# Patient Record
Sex: MALE | Race: WHITE | NOT HISPANIC OR LATINO
[De-identification: names, ages, dates, MRNs, and addresses within clinical notes are randomized per-mention and may not be internally consistent; named-entity substitution may affect disease eponyms.]

---

## 2017-04-14 ENCOUNTER — TRANSCRIPTION ENCOUNTER (OUTPATIENT)
Age: 52
End: 2017-04-14

## 2017-04-17 PROBLEM — Z00.00 ENCOUNTER FOR PREVENTIVE HEALTH EXAMINATION: Status: ACTIVE | Noted: 2017-04-17

## 2017-04-20 ENCOUNTER — APPOINTMENT (OUTPATIENT)
Dept: UROLOGY | Facility: CLINIC | Age: 52
End: 2017-04-20

## 2017-04-20 VITALS
SYSTOLIC BLOOD PRESSURE: 157 MMHG | DIASTOLIC BLOOD PRESSURE: 95 MMHG | HEIGHT: 70 IN | HEART RATE: 65 BPM | BODY MASS INDEX: 32.78 KG/M2 | WEIGHT: 229 LBS

## 2017-04-20 DIAGNOSIS — Z95.1 PRESENCE OF AORTOCORONARY BYPASS GRAFT: ICD-10-CM

## 2017-04-22 LAB
ALBUMIN SERPL ELPH-MCNC: 4.2 G/DL
ALP BLD-CCNC: 70 U/L
ALT SERPL-CCNC: 19 U/L
ANION GAP SERPL CALC-SCNC: 15 MMOL/L
APPEARANCE: CLEAR
AST SERPL-CCNC: 16 U/L
BACTERIA UR CULT: NORMAL
BACTERIA: NEGATIVE
BILIRUB SERPL-MCNC: 0.5 MG/DL
BILIRUBIN URINE: NEGATIVE
BLOOD URINE: NEGATIVE
BUN SERPL-MCNC: 17 MG/DL
CALCIUM SERPL-MCNC: 8.8 MG/DL
CHLORIDE SERPL-SCNC: 102 MMOL/L
CO2 SERPL-SCNC: 26 MMOL/L
COLOR: YELLOW
CREAT SERPL-MCNC: 0.96 MG/DL
GLUCOSE QUALITATIVE U: NORMAL MG/DL
GLUCOSE SERPL-MCNC: 92 MG/DL
KETONES URINE: NEGATIVE
LEUKOCYTE ESTERASE URINE: NEGATIVE
MICROSCOPIC-UA: NORMAL
NITRITE URINE: NEGATIVE
PH URINE: 5.5
POTASSIUM SERPL-SCNC: 4 MMOL/L
PROT SERPL-MCNC: 7.1 G/DL
PROTEIN URINE: NEGATIVE MG/DL
PSA FREE FLD-MCNC: 19.9 %
PSA FREE SERPL-MCNC: 0.43 NG/ML
PSA SERPL-MCNC: 2.16 NG/ML
RED BLOOD CELLS URINE: 4 /HPF
SODIUM SERPL-SCNC: 143 MMOL/L
SPECIFIC GRAVITY URINE: 1.02
SQUAMOUS EPITHELIAL CELLS: 0 /HPF
UROBILINOGEN URINE: NORMAL MG/DL
WHITE BLOOD CELLS URINE: 1 /HPF

## 2017-05-19 ENCOUNTER — APPOINTMENT (OUTPATIENT)
Dept: ULTRASOUND IMAGING | Facility: CLINIC | Age: 52
End: 2017-05-19

## 2017-05-19 ENCOUNTER — OUTPATIENT (OUTPATIENT)
Dept: OUTPATIENT SERVICES | Facility: HOSPITAL | Age: 52
LOS: 1 days | End: 2017-05-19

## 2017-05-19 ENCOUNTER — APPOINTMENT (OUTPATIENT)
Dept: UROLOGY | Facility: CLINIC | Age: 52
End: 2017-05-19

## 2017-05-19 ENCOUNTER — TRANSCRIPTION ENCOUNTER (OUTPATIENT)
Age: 52
End: 2017-05-19

## 2017-05-19 RX ORDER — CIPROFLOXACIN HYDROCHLORIDE 500 MG/1
500 TABLET, FILM COATED ORAL
Refills: 0 | Status: DISCONTINUED | COMMUNITY
End: 2017-05-19

## 2017-05-20 LAB
APPEARANCE: CLEAR
BILIRUBIN URINE: NEGATIVE
BLOOD URINE: NEGATIVE
COLOR: YELLOW
GLUCOSE QUALITATIVE U: NORMAL MG/DL
KETONES URINE: ABNORMAL
LEUKOCYTE ESTERASE URINE: NEGATIVE
NITRITE URINE: NEGATIVE
PH URINE: 5.5
PROTEIN URINE: NEGATIVE MG/DL
SPECIFIC GRAVITY URINE: 1.02
UROBILINOGEN URINE: NORMAL MG/DL

## 2017-06-21 ENCOUNTER — APPOINTMENT (OUTPATIENT)
Dept: RADIOLOGY | Facility: CLINIC | Age: 52
End: 2017-06-21

## 2017-06-21 ENCOUNTER — OUTPATIENT (OUTPATIENT)
Dept: OUTPATIENT SERVICES | Facility: HOSPITAL | Age: 52
LOS: 1 days | End: 2017-06-21

## 2017-11-06 ENCOUNTER — APPOINTMENT (OUTPATIENT)
Dept: UROLOGY | Facility: CLINIC | Age: 52
End: 2017-11-06
Payer: COMMERCIAL

## 2017-11-06 PROCEDURE — 99214 OFFICE O/P EST MOD 30 MIN: CPT

## 2017-11-07 LAB
APPEARANCE: CLEAR
BACTERIA: NEGATIVE
BILIRUBIN URINE: NEGATIVE
BLOOD URINE: NEGATIVE
CALCIUM OXALATE CRYSTALS: ABNORMAL
COLOR: ABNORMAL
GLUCOSE QUALITATIVE U: NEGATIVE MG/DL
HYALINE CASTS: 0 /LPF
KETONES URINE: ABNORMAL
LEUKOCYTE ESTERASE URINE: NEGATIVE
MICROSCOPIC-UA: NORMAL
NITRITE URINE: NEGATIVE
PH URINE: 5
PROTEIN URINE: NEGATIVE MG/DL
RED BLOOD CELLS URINE: 0 /HPF
SPECIFIC GRAVITY URINE: 1.03
SQUAMOUS EPITHELIAL CELLS: 1 /HPF
UROBILINOGEN URINE: NEGATIVE MG/DL
WHITE BLOOD CELLS URINE: 1 /HPF

## 2017-12-18 ENCOUNTER — RX RENEWAL (OUTPATIENT)
Age: 52
End: 2017-12-18

## 2017-12-21 ENCOUNTER — RX RENEWAL (OUTPATIENT)
Age: 52
End: 2017-12-21

## 2018-01-04 ENCOUNTER — APPOINTMENT (OUTPATIENT)
Dept: CARDIOLOGY | Facility: CLINIC | Age: 53
End: 2018-01-04

## 2018-01-11 ENCOUNTER — APPOINTMENT (OUTPATIENT)
Dept: CARDIOLOGY | Facility: CLINIC | Age: 53
End: 2018-01-11
Payer: COMMERCIAL

## 2018-01-11 VITALS
SYSTOLIC BLOOD PRESSURE: 129 MMHG | BODY MASS INDEX: 33.36 KG/M2 | HEIGHT: 70 IN | HEART RATE: 70 BPM | RESPIRATION RATE: 15 BRPM | WEIGHT: 233 LBS | DIASTOLIC BLOOD PRESSURE: 80 MMHG

## 2018-01-11 PROCEDURE — 93000 ELECTROCARDIOGRAM COMPLETE: CPT

## 2018-01-11 PROCEDURE — 99213 OFFICE O/P EST LOW 20 MIN: CPT

## 2018-01-11 RX ORDER — METOPROLOL TARTRATE 50 MG/1
50 TABLET, FILM COATED ORAL
Refills: 0 | Status: COMPLETED | COMMUNITY
End: 2018-01-11

## 2018-02-20 ENCOUNTER — RX RENEWAL (OUTPATIENT)
Age: 53
End: 2018-02-20

## 2018-04-06 RX ORDER — METOPROLOL SUCCINATE 25 MG/1
25 TABLET, EXTENDED RELEASE ORAL
Qty: 90 | Refills: 1 | Status: DISCONTINUED | COMMUNITY
Start: 2018-01-11 | End: 2018-04-06

## 2018-05-03 ENCOUNTER — APPOINTMENT (OUTPATIENT)
Dept: CARDIOLOGY | Facility: CLINIC | Age: 53
End: 2018-05-03
Payer: COMMERCIAL

## 2018-05-03 VITALS
HEART RATE: 72 BPM | DIASTOLIC BLOOD PRESSURE: 85 MMHG | BODY MASS INDEX: 33.07 KG/M2 | WEIGHT: 231 LBS | SYSTOLIC BLOOD PRESSURE: 122 MMHG | HEIGHT: 70 IN | RESPIRATION RATE: 15 BRPM

## 2018-05-03 PROCEDURE — 99214 OFFICE O/P EST MOD 30 MIN: CPT

## 2018-05-07 ENCOUNTER — APPOINTMENT (OUTPATIENT)
Dept: UROLOGY | Facility: CLINIC | Age: 53
End: 2018-05-07
Payer: COMMERCIAL

## 2018-05-07 DIAGNOSIS — L72.3 SEBACEOUS CYST: ICD-10-CM

## 2018-05-07 DIAGNOSIS — L08.9 SEBACEOUS CYST: ICD-10-CM

## 2018-05-07 PROCEDURE — 99213 OFFICE O/P EST LOW 20 MIN: CPT

## 2018-05-16 ENCOUNTER — APPOINTMENT (OUTPATIENT)
Dept: ULTRASOUND IMAGING | Facility: CLINIC | Age: 53
End: 2018-05-16

## 2018-05-23 ENCOUNTER — APPOINTMENT (OUTPATIENT)
Dept: ULTRASOUND IMAGING | Facility: CLINIC | Age: 53
End: 2018-05-23

## 2018-05-24 ENCOUNTER — RX RENEWAL (OUTPATIENT)
Age: 53
End: 2018-05-24

## 2018-08-28 ENCOUNTER — RX RENEWAL (OUTPATIENT)
Age: 53
End: 2018-08-28

## 2018-10-11 ENCOUNTER — APPOINTMENT (OUTPATIENT)
Dept: CARDIOLOGY | Facility: CLINIC | Age: 53
End: 2018-10-11

## 2018-10-22 ENCOUNTER — RX RENEWAL (OUTPATIENT)
Age: 53
End: 2018-10-22

## 2018-10-25 ENCOUNTER — APPOINTMENT (OUTPATIENT)
Dept: CARDIOLOGY | Facility: CLINIC | Age: 53
End: 2018-10-25

## 2018-11-09 ENCOUNTER — OUTPATIENT (OUTPATIENT)
Dept: OUTPATIENT SERVICES | Facility: HOSPITAL | Age: 53
LOS: 1 days | End: 2018-11-09

## 2018-11-09 ENCOUNTER — APPOINTMENT (OUTPATIENT)
Dept: ULTRASOUND IMAGING | Facility: CLINIC | Age: 53
End: 2018-11-09

## 2018-11-09 ENCOUNTER — APPOINTMENT (OUTPATIENT)
Dept: UROLOGY | Facility: CLINIC | Age: 53
End: 2018-11-09
Payer: COMMERCIAL

## 2018-11-09 PROCEDURE — 99213 OFFICE O/P EST LOW 20 MIN: CPT

## 2018-11-15 ENCOUNTER — APPOINTMENT (OUTPATIENT)
Dept: CARDIOLOGY | Facility: CLINIC | Age: 53
End: 2018-11-15

## 2018-11-19 ENCOUNTER — RX RENEWAL (OUTPATIENT)
Age: 53
End: 2018-11-19

## 2018-12-13 ENCOUNTER — APPOINTMENT (OUTPATIENT)
Dept: CARDIOLOGY | Facility: CLINIC | Age: 53
End: 2018-12-13
Payer: COMMERCIAL

## 2018-12-13 VITALS
HEART RATE: 69 BPM | BODY MASS INDEX: 32.78 KG/M2 | HEIGHT: 70 IN | WEIGHT: 229 LBS | SYSTOLIC BLOOD PRESSURE: 122 MMHG | DIASTOLIC BLOOD PRESSURE: 74 MMHG

## 2018-12-13 PROCEDURE — 93000 ELECTROCARDIOGRAM COMPLETE: CPT

## 2018-12-13 PROCEDURE — 99213 OFFICE O/P EST LOW 20 MIN: CPT

## 2018-12-13 NOTE — PHYSICAL EXAM
[General Appearance - Well Developed] : well developed [Normal Appearance] : normal appearance [Well Groomed] : well groomed [General Appearance - Well Nourished] : well nourished [No Deformities] : no deformities [General Appearance - In No Acute Distress] : no acute distress [Normal Conjunctiva] : the conjunctiva exhibited no abnormalities [Normal Oral Mucosa] : normal oral mucosa [Normal Jugular Venous A Waves Present] : normal jugular venous A waves present [Normal Jugular Venous V Waves Present] : normal jugular venous V waves present [No Jugular Venous Chacko A Waves] : no jugular venous chacko A waves [Respiration, Rhythm And Depth] : normal respiratory rhythm and effort [Exaggerated Use Of Accessory Muscles For Inspiration] : no accessory muscle use [Auscultation Breath Sounds / Voice Sounds] : lungs were clear to auscultation bilaterally [Abdomen Soft] : soft [Abnormal Walk] : normal gait [Nail Clubbing] : no clubbing of the fingernails [Cyanosis, Localized] : no localized cyanosis [Petechial Hemorrhages (___cm)] : no petechial hemorrhages [Skin Color & Pigmentation] : normal skin color and pigmentation [] : no rash [No Venous Stasis] : no venous stasis [Skin Lesions] : no skin lesions [No Skin Ulcers] : no skin ulcer [No Xanthoma] : no  xanthoma was observed [Oriented To Time, Place, And Person] : oriented to person, place, and time [Affect] : the affect was normal [Mood] : the mood was normal [No Anxiety] : not feeling anxious [5th Left ICS - MCL] : palpated at the 5th LICS in the midclavicular line [Normal] : normal [No Precordial Heave] : no precordial heave was noted [Normal Rate] : normal [Rhythm Regular] : regular [Normal S1] : normal S1 [Normal S2] : normal S2 [Click] : no click [Pericardial Rub] : no pericardial rub [I] : a grade 1 [2+] : left 2+ [No Pitting Edema] : no pitting edema present

## 2018-12-13 NOTE — REASON FOR VISIT
[Follow-Up - Clinic] : a clinic follow-up of [Angina Pectoris] : angina pectoris [Hyperlipidemia] : hyperlipidemia [Hypertension] : hypertension [Prior Myocardial Infarction] : a prior myocardial infarction [FreeTextEntry1] : Murmur

## 2018-12-13 NOTE — DISCUSSION/SUMMARY
[FreeTextEntry1] : This is a 53-year-old male with past medical history significant for coronary artery disease status post myocardial infarction, status post coronary artery bypass surgery in February 2016 at [facility], hyperlipidemia, hypertension, comes in for followup cardiac evaluation. He denies chest pain, shortness breath, dizziness or syncope. The patient remains on Micardis 80 mg daily, Toprol-XL 25 mg, aspirin 325 mg, and Nexium. He is also a dose of Crestor 10 mg per day. \par \par Blood work done in January 2018 demonstrated a direct LDL of 69 mg/dL. The patient will continue on his current medication. He reports that the Micardis is too expensive and it'll price out Benicar and Avapro.\par The patient has an upper respiratory tract infection with productive cough. He was started on a Z-Abdulaziz and is instructed to followup with his primary care physician. He will followup with me in 3 months.\par \par  echo Doppler examination done March 29, 2017 demonstrated ejection fraction of 50%, mild mitral and tricuspid valve regurgitation, aortic valve sclerosis with mild to moderate aortic valve regurgitation, and hypokinesis involving inferior wall, septum, consistent with mild left ventricular dysfunction. \par l.\par The patient understands that aerobic exercises must be increased to 40 minutes 4 times per week. A detailed discussion of lifestyle modification was done today. The patient has a good understanding of the diagnosis, and treatment plan. Lifestyle modification was also outlined.

## 2018-12-13 NOTE — DISCUSSION/SUMMARY
[FreeTextEntry1] : This is a 53-year-old male with past medical history significant for coronary artery disease status post myocardial infarction, status post coronary artery bypass surgery in February 2016 at [facility], hyperlipidemia, hypertension, comes in for followup cardiac evaluation. He denies chest pain, shortness breath, dizziness or syncope. The patient remains on Micardis 80 mg daily, Toprol-XL 25 mg, aspirin 325 mg, and Nexium. He is also a dose of Crestor 10 mg per day. \par \par Blood work done in January 2018 demonstrated a direct LDL of 69 mg/dL. The patient will continue on his current medication. He reports that the Micardis is too expensive and it'll price out Benicar and Avapro.\par The patient has an upper respiratory tract infection with productive cough. He was started on a Z-Abdulaziz and is instructed to followup with his primary care physician. He will followup with me in 3 months.\par \par  echo Doppler examination done March 29, 2017 demonstrated ejection fraction of 50%, mild mitral and tricuspid valve regurgitation, aortic valve sclerosis with mild to moderate aortic valve regurgitation, and hypokinesis involving inferior wall, septum, consistent with mild left ventricular dysfunction. \par l.\par The patient understands that aerobic exercises must be increased to 40 minutes 4 times per week. A detailed discussion of lifestyle modification was done today. The patient has a good understanding of the diagnosis, and treatment plan. Lifestyle modification was also outlined.

## 2018-12-18 RX ORDER — IBUPROFEN 800 MG/1
800 TABLET, FILM COATED ORAL
Refills: 0 | Status: COMPLETED | COMMUNITY
End: 2018-12-18

## 2018-12-18 RX ORDER — TAMSULOSIN HYDROCHLORIDE 0.4 MG/1
CAPSULE ORAL
Refills: 0 | Status: COMPLETED | COMMUNITY
End: 2018-12-18

## 2018-12-18 RX ORDER — AZITHROMYCIN 250 MG/1
250 TABLET, FILM COATED ORAL
Qty: 6 | Refills: 1 | Status: COMPLETED | COMMUNITY
Start: 2018-05-03 | End: 2018-12-18

## 2018-12-18 RX ORDER — OXYCODONE HYDROCHLORIDE AND ACETAMINOPHEN 5; 325 MG/1; MG/1
5-325 TABLET ORAL
Refills: 0 | Status: COMPLETED | COMMUNITY
End: 2018-12-18

## 2018-12-18 RX ORDER — AZITHROMYCIN 250 MG/1
250 TABLET, FILM COATED ORAL
Qty: 6 | Refills: 0 | Status: COMPLETED | COMMUNITY
Start: 2018-05-03 | End: 2018-12-18

## 2018-12-18 RX ORDER — TAMSULOSIN HYDROCHLORIDE 0.4 MG/1
0.4 CAPSULE ORAL
Qty: 180 | Refills: 1 | Status: DISCONTINUED | COMMUNITY
Start: 2017-04-20 | End: 2018-12-18

## 2018-12-18 RX ORDER — LANSOPRAZOLE 30 MG/1
30 CAPSULE, DELAYED RELEASE ORAL
Refills: 0 | Status: COMPLETED | COMMUNITY
End: 2018-12-18

## 2018-12-18 RX ORDER — METOLAZONE 5 MG/1
TABLET ORAL
Refills: 0 | Status: COMPLETED | COMMUNITY
End: 2018-12-18

## 2018-12-18 RX ORDER — CEPHALEXIN 500 MG/1
500 CAPSULE ORAL
Qty: 28 | Refills: 0 | Status: COMPLETED | COMMUNITY
Start: 2018-05-07 | End: 2018-12-18

## 2018-12-18 RX ORDER — TAMSULOSIN HYDROCHLORIDE 0.4 MG/1
0.4 CAPSULE ORAL
Qty: 180 | Refills: 1 | Status: DISCONTINUED | COMMUNITY
Start: 2017-05-19 | End: 2018-12-18

## 2019-01-14 ENCOUNTER — RX RENEWAL (OUTPATIENT)
Age: 54
End: 2019-01-14

## 2019-01-15 ENCOUNTER — RX RENEWAL (OUTPATIENT)
Age: 54
End: 2019-01-15

## 2019-02-12 ENCOUNTER — RX RENEWAL (OUTPATIENT)
Age: 54
End: 2019-02-12

## 2019-02-20 ENCOUNTER — RX RENEWAL (OUTPATIENT)
Age: 54
End: 2019-02-20

## 2019-04-10 ENCOUNTER — RX RENEWAL (OUTPATIENT)
Age: 54
End: 2019-04-10

## 2019-04-18 ENCOUNTER — APPOINTMENT (OUTPATIENT)
Dept: CARDIOLOGY | Facility: CLINIC | Age: 54
End: 2019-04-18

## 2019-05-10 ENCOUNTER — APPOINTMENT (OUTPATIENT)
Dept: UROLOGY | Facility: CLINIC | Age: 54
End: 2019-05-10
Payer: COMMERCIAL

## 2019-05-10 DIAGNOSIS — N20.0 CALCULUS OF KIDNEY: ICD-10-CM

## 2019-05-10 DIAGNOSIS — N41.0 ACUTE PROSTATITIS: ICD-10-CM

## 2019-05-10 DIAGNOSIS — K59.00 CONSTIPATION, UNSPECIFIED: ICD-10-CM

## 2019-05-10 LAB
BILIRUB UR QL STRIP: ABNORMAL
CLARITY UR: NORMAL
COLLECTION METHOD: NORMAL
GLUCOSE UR-MCNC: NORMAL
HCG UR QL: 1 EU/DL
HGB UR QL STRIP.AUTO: ABNORMAL
KETONES UR-MCNC: 15
LEUKOCYTE ESTERASE UR QL STRIP: ABNORMAL
NITRITE UR QL STRIP: NORMAL
PH UR STRIP: 5.5
PROT UR STRIP-MCNC: 100
SP GR UR STRIP: 1.02

## 2019-05-10 PROCEDURE — 51798 US URINE CAPACITY MEASURE: CPT

## 2019-05-10 PROCEDURE — 99214 OFFICE O/P EST MOD 30 MIN: CPT | Mod: 25

## 2019-05-10 PROCEDURE — 81003 URINALYSIS AUTO W/O SCOPE: CPT | Mod: QW

## 2019-05-10 NOTE — HISTORY OF PRESENT ILLNESS
[Urinary Urgency] : urinary urgency [Urinary Frequency] : urinary frequency [Straining] : straining [Weak Stream] : weak stream [Dysuria] : dysuria [Intermittency] : intermittency [FreeTextEntry1] : 51 year old male with history of stone (left side 9 mm) one year ago. Had elevated PSA which he did not followup.\par Had been on Flomax 2 tabs for urinary symptoms and stone disease.\par No followup for 1 year.\par \par \par 5.18.2017\par no further flank pain\par did not pass any further stone\par no pain; no longer straining urine. \par urinary symptoms improved on Flomax 2 tabs daily\par improved on 2 tablets; patient does not believe his symptoms warrant intervention.\par \par 11.16.2017\par has not passed stone / passage of small stone\par urinary symptoms continue to be much improved\par no further episodes of pain or hematuria\par \par 5.7.2018\par no further flank pain\par urinating with good stream\par nocturia x 1\par stopped drinking MATE caffeinated product \par \par 11.9.2018\par patient did not get renal usg re ? ureteral stone passage\par Patient thinks he passed ? small stone\par was supposed to get USG but did not because of excision of back cyst\par taking Tamsulosin\par when stopped procedure symtoms get worse and unhappy\par \par 5.10.2019\par 6 days ago developed \par developed urinary symptoms\par dysuria and frequency\par called relative treated nitrufurantoin\par mild improvement\par also complaining of constipation\par after laxative symtoms \par continues tamsulosin 2 tabs daily [Urinary Incontinence] : no urinary incontinence [Urinary Retention] : no urinary retention [Nocturia] : no nocturia [Bladder Spasm] : no bladder spasm [Hematuria - Microscopic] : no microscopic hematuria [Hematuria - Gross] : no gross hematuria

## 2019-05-10 NOTE — ASSESSMENT
[FreeTextEntry1] : Patient presents with symptoms of acute prostatitis which were treated with nitofuranotin with some improvement.  He reports having had chills which have now resolved.  Symptoms began when took allegra.  He has stopped.  He also complains of constipation whiich he has been treating effectively with laxatives.\par He examination demonstrates NO PVR and TENDER Prostate\par He will be switched to the appropriate antibiotics\par He never had CT which was previously ordered re stone disease.\par This was reordered.\par We discussed fluid management\par We discussed bowel management\par We discussed stone disease\par He was warned to present to ER if he develops fevers chills etc.\par

## 2019-05-13 LAB — BACTERIA UR CULT: NORMAL

## 2019-05-29 ENCOUNTER — OTHER (OUTPATIENT)
Age: 54
End: 2019-05-29

## 2019-05-30 ENCOUNTER — FORM ENCOUNTER (OUTPATIENT)
Age: 54
End: 2019-05-30

## 2019-05-30 DIAGNOSIS — R39.9 UNSPECIFIED SYMPTOMS AND SIGNS INVOLVING THE GENITOURINARY SYSTEM: ICD-10-CM

## 2019-05-31 ENCOUNTER — APPOINTMENT (OUTPATIENT)
Dept: UROLOGY | Facility: CLINIC | Age: 54
End: 2019-05-31
Payer: COMMERCIAL

## 2019-05-31 ENCOUNTER — OUTPATIENT (OUTPATIENT)
Dept: OUTPATIENT SERVICES | Facility: HOSPITAL | Age: 54
LOS: 1 days | End: 2019-05-31

## 2019-05-31 ENCOUNTER — APPOINTMENT (OUTPATIENT)
Dept: CT IMAGING | Facility: CLINIC | Age: 54
End: 2019-05-31
Payer: COMMERCIAL

## 2019-05-31 DIAGNOSIS — R31.29 OTHER MICROSCOPIC HEMATURIA: ICD-10-CM

## 2019-05-31 DIAGNOSIS — N20.1 CALCULUS OF URETER: ICD-10-CM

## 2019-05-31 PROCEDURE — 74178 CT ABD&PLV WO CNTR FLWD CNTR: CPT | Mod: 26

## 2019-05-31 PROCEDURE — 99213 OFFICE O/P EST LOW 20 MIN: CPT

## 2019-05-31 RX ORDER — SULFAMETHOXAZOLE AND TRIMETHOPRIM 800; 160 MG/1; MG/1
800-160 TABLET ORAL
Qty: 28 | Refills: 0 | Status: DISCONTINUED | COMMUNITY
Start: 2019-05-10 | End: 2019-05-31

## 2019-05-31 NOTE — HISTORY OF PRESENT ILLNESS
[FreeTextEntry1] : 51 year old male with history of stone (left side 9 mm) one year ago. Had elevated PSA which he did not followup.\par Had been on Flomax 2 tabs for urinary symptoms and stone disease.\par No followup for 1 year.\par \par \par 5.18.2017\par no further flank pain\par did not pass any further stone\par no pain; no longer straining urine. \par urinary symptoms improved on Flomax 2 tabs daily\par improved on 2 tablets; patient does not believe his symptoms warrant intervention.\par \par 11.16.2017\par has not passed stone / passage of small stone\par urinary symptoms continue to be much improved\par no further episodes of pain or hematuria\par \par 5.7.2018\par no further flank pain\par urinating with good stream\par nocturia x 1\par stopped drinking MATE caffeinated product \par \par 11.9.2018\par patient did not get renal usg re ? ureteral stone passage\par Patient thinks he passed ? small stone\par was supposed to get USG but did not because of excision of back cyst\par taking Tamsulosin\par when stopped procedure symtoms get worse and unhappy\par \par 5.10.2019\par 6 days ago developed \par developed urinary symptoms\par dysuria and frequency\par called relative treated nitrufurantoin\par mild improvement\par also complaining of constipation\par after laxative symtoms \par continues tamsulosin 2 tabs daily \par \par 5.31.2019\par returns after treatment of prostatitis\par \par

## 2019-05-31 NOTE — ASSESSMENT
[FreeTextEntry1] : Patient now asymptomatic after acute prostatits\par Has not had CT - approval obtained w/o contrast\par Needs followup re ureteral stone\par Complaining of low libido - will assess T and prolactin

## 2019-06-04 PROBLEM — R39.9 ABNORMAL RENAL FINDING: Status: ACTIVE | Noted: 2019-06-04

## 2019-07-15 ENCOUNTER — RX RENEWAL (OUTPATIENT)
Age: 54
End: 2019-07-15

## 2019-08-09 ENCOUNTER — RX RENEWAL (OUTPATIENT)
Age: 54
End: 2019-08-09

## 2019-08-12 ENCOUNTER — RX RENEWAL (OUTPATIENT)
Age: 54
End: 2019-08-12

## 2019-08-14 ENCOUNTER — RX RENEWAL (OUTPATIENT)
Age: 54
End: 2019-08-14

## 2019-08-22 ENCOUNTER — APPOINTMENT (OUTPATIENT)
Dept: CARDIOLOGY | Facility: CLINIC | Age: 54
End: 2019-08-22
Payer: COMMERCIAL

## 2019-08-22 VITALS — WEIGHT: 216 LBS | BODY MASS INDEX: 30.24 KG/M2 | HEIGHT: 71 IN

## 2019-08-22 PROCEDURE — 99214 OFFICE O/P EST MOD 30 MIN: CPT

## 2019-08-22 PROCEDURE — 93000 ELECTROCARDIOGRAM COMPLETE: CPT

## 2019-08-22 RX ORDER — ROSUVASTATIN CALCIUM 10 MG/1
10 TABLET, FILM COATED ORAL
Qty: 30 | Refills: 0 | Status: COMPLETED | COMMUNITY
Start: 2018-01-11 | End: 2019-08-22

## 2019-08-29 RX ORDER — DOCUSATE SODIUM 100 MG/1
100 CAPSULE ORAL TWICE DAILY
Qty: 60 | Refills: 0 | Status: COMPLETED | COMMUNITY
Start: 2019-05-10 | End: 2019-08-29

## 2019-09-05 ENCOUNTER — RX RENEWAL (OUTPATIENT)
Age: 54
End: 2019-09-05

## 2019-11-25 ENCOUNTER — RX RENEWAL (OUTPATIENT)
Age: 54
End: 2019-11-25

## 2019-12-20 ENCOUNTER — APPOINTMENT (OUTPATIENT)
Dept: UROLOGY | Facility: CLINIC | Age: 54
End: 2019-12-20

## 2020-02-24 ENCOUNTER — RX RENEWAL (OUTPATIENT)
Age: 55
End: 2020-02-24

## 2020-03-12 ENCOUNTER — APPOINTMENT (OUTPATIENT)
Dept: CARDIOLOGY | Facility: CLINIC | Age: 55
End: 2020-03-12
Payer: COMMERCIAL

## 2020-03-12 VITALS
DIASTOLIC BLOOD PRESSURE: 82 MMHG | HEART RATE: 70 BPM | WEIGHT: 231 LBS | HEIGHT: 71 IN | BODY MASS INDEX: 32.34 KG/M2 | SYSTOLIC BLOOD PRESSURE: 130 MMHG | RESPIRATION RATE: 15 BRPM

## 2020-03-12 PROCEDURE — 93000 ELECTROCARDIOGRAM COMPLETE: CPT

## 2020-03-12 PROCEDURE — 99214 OFFICE O/P EST MOD 30 MIN: CPT

## 2020-03-23 ENCOUNTER — RX RENEWAL (OUTPATIENT)
Age: 55
End: 2020-03-23

## 2020-09-04 ENCOUNTER — TRANSCRIPTION ENCOUNTER (OUTPATIENT)
Age: 55
End: 2020-09-04

## 2020-09-04 ENCOUNTER — APPOINTMENT (OUTPATIENT)
Dept: UROLOGY | Facility: CLINIC | Age: 55
End: 2020-09-04
Payer: COMMERCIAL

## 2020-09-04 VITALS — DIASTOLIC BLOOD PRESSURE: 88 MMHG | SYSTOLIC BLOOD PRESSURE: 133 MMHG | TEMPERATURE: 97.3 F

## 2020-09-04 DIAGNOSIS — Z85.828 PERSONAL HISTORY OF OTHER MALIGNANT NEOPLASM OF SKIN: ICD-10-CM

## 2020-09-04 DIAGNOSIS — N28.89 OTHER SPECIFIED DISORDERS OF KIDNEY AND URETER: ICD-10-CM

## 2020-09-04 PROCEDURE — 99214 OFFICE O/P EST MOD 30 MIN: CPT

## 2020-09-04 NOTE — ASSESSMENT
[FreeTextEntry1] : Patient now asymptomatic after acute prostatits\par Has not had CT - approval obtained w/o contrast\par Needs followup re ureteral stone\par Complaining of low libido - will assess T and prolactin\par \par 9.4.2020 \par patient did not followup on CT\par again emphasized need for CT to followup renal mass\par continue Flomax 2 tabs\par patient interested in urololift when completes surgery for Basal cell\par \par complaining of loss of libido\par will recheck T\par discussed PSA \par \par referral to Isac Norman re symptomatic umbilical hernia\par

## 2020-09-04 NOTE — HISTORY OF PRESENT ILLNESS
[Currently Experiencing ___] :  [unfilled] [Urinary Frequency] : urinary frequency [Nocturia] : nocturia [FreeTextEntry1] : 51 year old male with history of stone (left side 9 mm) one year ago. Had elevated PSA which he did not followup.\par Had been on Flomax 2 tabs for urinary symptoms and stone disease.\par No followup for 1 year.\par \par \par 5.18.2017\par no further flank pain\par did not pass any further stone\par no pain; no longer straining urine. \par urinary symptoms improved on Flomax 2 tabs daily\par improved on 2 tablets; patient does not believe his symptoms warrant intervention.\par \par 11.16.2017\par has not passed stone / passage of small stone\par urinary symptoms continue to be much improved\par no further episodes of pain or hematuria\par \par 5.7.2018\par no further flank pain\par urinating with good stream\par nocturia x 1\par stopped drinking MATE caffeinated product \par \par 11.9.2018\par patient did not get renal usg re ? ureteral stone passage\par Patient thinks he passed ? small stone\par was supposed to get USG but did not because of excision of back cyst\par taking Tamsulosin\par when stopped procedure symtoms get worse and unhappy\par \par 5.10.2019\par 6 days ago developed \par developed urinary symptoms\par dysuria and frequency\par called relative treated nitrufurantoin\par mild improvement\par also complaining of constipation\par after laxative symtoms \par continues tamsulosin 2 tabs daily \par \par 5.31.2019\par returns after treatment of prostatitis\par \par \par 9.4.2020\par continues with LUTS\par on tamsulosin 2 tabs daily\par continues to drink coffee and cola\par he had been drinking 20 cups per day and now 2; aware of impact on symptoms\par complaining of decreased libido\par marital discord; life stresses

## 2020-09-05 ENCOUNTER — TRANSCRIPTION ENCOUNTER (OUTPATIENT)
Age: 55
End: 2020-09-05

## 2020-09-05 LAB — PSA SERPL-MCNC: 2.44 NG/ML

## 2020-09-08 ENCOUNTER — TRANSCRIPTION ENCOUNTER (OUTPATIENT)
Age: 55
End: 2020-09-08

## 2020-09-08 LAB
ANION GAP SERPL CALC-SCNC: 19 MMOL/L
BUN SERPL-MCNC: 23 MG/DL
CALCIUM SERPL-MCNC: 9.2 MG/DL
CHLORIDE SERPL-SCNC: 104 MMOL/L
CO2 SERPL-SCNC: 22 MMOL/L
CREAT SERPL-MCNC: 1.35 MG/DL
GLUCOSE SERPL-MCNC: 108 MG/DL
POTASSIUM SERPL-SCNC: 4.1 MMOL/L
PROLACTIN SERPL-MCNC: 10 NG/ML
SODIUM SERPL-SCNC: 145 MMOL/L

## 2020-09-09 ENCOUNTER — TRANSCRIPTION ENCOUNTER (OUTPATIENT)
Age: 55
End: 2020-09-09

## 2020-09-09 LAB
TESTOST BND SERPL-MCNC: 6.5 PG/ML
TESTOST SERPL-MCNC: 232.5 NG/DL

## 2020-09-10 ENCOUNTER — OUTPATIENT (OUTPATIENT)
Dept: OUTPATIENT SERVICES | Facility: HOSPITAL | Age: 55
LOS: 1 days | End: 2020-09-10

## 2020-09-10 ENCOUNTER — RESULT REVIEW (OUTPATIENT)
Age: 55
End: 2020-09-10

## 2020-09-10 ENCOUNTER — APPOINTMENT (OUTPATIENT)
Dept: CT IMAGING | Facility: CLINIC | Age: 55
End: 2020-09-10
Payer: COMMERCIAL

## 2020-09-10 ENCOUNTER — TRANSCRIPTION ENCOUNTER (OUTPATIENT)
Age: 55
End: 2020-09-10

## 2020-09-10 PROCEDURE — 74178 CT ABD&PLV WO CNTR FLWD CNTR: CPT | Mod: 26

## 2020-09-14 ENCOUNTER — TRANSCRIPTION ENCOUNTER (OUTPATIENT)
Age: 55
End: 2020-09-14

## 2020-09-14 ENCOUNTER — APPOINTMENT (OUTPATIENT)
Dept: UROLOGY | Facility: AMBULATORY SURGERY CENTER | Age: 55
End: 2020-09-14

## 2020-09-23 ENCOUNTER — APPOINTMENT (OUTPATIENT)
Dept: UROLOGY | Facility: CLINIC | Age: 55
End: 2020-09-23

## 2020-10-07 ENCOUNTER — OUTPATIENT (OUTPATIENT)
Dept: OUTPATIENT SERVICES | Facility: HOSPITAL | Age: 55
LOS: 1 days | End: 2020-10-07
Payer: COMMERCIAL

## 2020-10-07 ENCOUNTER — APPOINTMENT (OUTPATIENT)
Dept: UROLOGY | Facility: CLINIC | Age: 55
End: 2020-10-07
Payer: COMMERCIAL

## 2020-10-07 VITALS — OXYGEN SATURATION: 98 % | DIASTOLIC BLOOD PRESSURE: 82 MMHG | SYSTOLIC BLOOD PRESSURE: 121 MMHG | TEMPERATURE: 97.8 F

## 2020-10-07 DIAGNOSIS — K42.9 UMBILICAL HERNIA W/OUT OBSTRUCTION OR GANGRENE: ICD-10-CM

## 2020-10-07 DIAGNOSIS — N28.89 OTHER SPECIFIED DISORDERS OF KIDNEY AND URETER: ICD-10-CM

## 2020-10-07 PROCEDURE — 71046 X-RAY EXAM CHEST 2 VIEWS: CPT | Mod: 26

## 2020-10-07 PROCEDURE — 71046 X-RAY EXAM CHEST 2 VIEWS: CPT

## 2020-10-07 PROCEDURE — 99214 OFFICE O/P EST MOD 30 MIN: CPT

## 2020-10-08 LAB
ALBUMIN SERPL ELPH-MCNC: 4.5 G/DL
ALP BLD-CCNC: 77 U/L
ALT SERPL-CCNC: 18 U/L
ANION GAP SERPL CALC-SCNC: 15 MMOL/L
APPEARANCE: CLEAR
APTT BLD: 29.8 SEC
AST SERPL-CCNC: 18 U/L
BACTERIA: NEGATIVE
BASOPHILS # BLD AUTO: 0.03 K/UL
BASOPHILS NFR BLD AUTO: 0.5 %
BILIRUB SERPL-MCNC: 0.4 MG/DL
BILIRUBIN URINE: NEGATIVE
BLOOD URINE: NEGATIVE
BUN SERPL-MCNC: 11 MG/DL
CALCIUM SERPL-MCNC: 9 MG/DL
CHLORIDE SERPL-SCNC: 104 MMOL/L
CO2 SERPL-SCNC: 21 MMOL/L
COLOR: NORMAL
CREAT SERPL-MCNC: 1.02 MG/DL
EOSINOPHIL # BLD AUTO: 0.11 K/UL
EOSINOPHIL NFR BLD AUTO: 2 %
GLUCOSE QUALITATIVE U: NEGATIVE
GLUCOSE SERPL-MCNC: 99 MG/DL
HCT VFR BLD CALC: 40.6 %
HGB BLD-MCNC: 13.1 G/DL
HYALINE CASTS: 0 /LPF
IMM GRANULOCYTES NFR BLD AUTO: 0.2 %
INR PPP: 1.13 RATIO
KETONES URINE: NEGATIVE
LEUKOCYTE ESTERASE URINE: NEGATIVE
LYMPHOCYTES # BLD AUTO: 1.88 K/UL
LYMPHOCYTES NFR BLD AUTO: 33.6 %
MAN DIFF?: NORMAL
MCHC RBC-ENTMCNC: 30.3 PG
MCHC RBC-ENTMCNC: 32.3 GM/DL
MCV RBC AUTO: 94 FL
MICROSCOPIC-UA: NORMAL
MONOCYTES # BLD AUTO: 0.47 K/UL
MONOCYTES NFR BLD AUTO: 8.4 %
NEUTROPHILS # BLD AUTO: 3.09 K/UL
NEUTROPHILS NFR BLD AUTO: 55.3 %
NITRITE URINE: NEGATIVE
PH URINE: 6
PLATELET # BLD AUTO: 189 K/UL
POTASSIUM SERPL-SCNC: 4.1 MMOL/L
PROT SERPL-MCNC: 6.7 G/DL
PROTEIN URINE: NEGATIVE
PT BLD: 13.3 SEC
RBC # BLD: 4.32 M/UL
RBC # FLD: 13.1 %
RED BLOOD CELLS URINE: 1 /HPF
SODIUM SERPL-SCNC: 141 MMOL/L
SPECIFIC GRAVITY URINE: 1.01
SQUAMOUS EPITHELIAL CELLS: 0 /HPF
UROBILINOGEN URINE: NORMAL
WBC # FLD AUTO: 5.59 K/UL
WHITE BLOOD CELLS URINE: 1 /HPF

## 2020-10-08 NOTE — ASSESSMENT
[FreeTextEntry1] : 54yo male with 2.3cm right lower pole renal mass\par Reviewed imaging studies and report with patient\par Discussed diagnosis of small renal mass and differential diagnosis.\par 20-30% of small renal masses are benign.\par Renal masses less than 3cm are felt to have very low metastatic potential \par Discussed treatment options including observation, percutaneous biopsy, percutaneous needle ablation by cryotherapy or radiofrequency ablation, or partial nephrectomy.\par The 6mm growth in 1 year is concerning as average growth rate is about 3mm/year\par He is interested in robotic partial nephrectomy. Reviewed procedure, risks and benefits\par Medical and cardiac clearance\par CXR for staging

## 2020-10-08 NOTE — PHYSICAL EXAM
[General Appearance - Well Developed] : well developed [General Appearance - Well Nourished] : well nourished [Normal Appearance] : normal appearance [Well Groomed] : well groomed [General Appearance - In No Acute Distress] : no acute distress [FreeTextEntry1] : obese, BMI = 32 [Edema] : no peripheral edema [Respiration, Rhythm And Depth] : normal respiratory rhythm and effort [Exaggerated Use Of Accessory Muscles For Inspiration] : no accessory muscle use [Abdomen Soft] : soft [Abdomen Tenderness] : non-tender [Costovertebral Angle Tenderness] : no ~M costovertebral angle tenderness [Normal Station and Gait] : the gait and station were normal for the patient's age [] : no rash [No Focal Deficits] : no focal deficits [Oriented To Time, Place, And Person] : oriented to person, place, and time [Affect] : the affect was normal [Mood] : the mood was normal [Not Anxious] : not anxious

## 2020-10-08 NOTE — HISTORY OF PRESENT ILLNESS
[FreeTextEntry1] : 56yo male with history of CAD s/p CABG a few years ago, referred by Dr. Mason for 2.3cm right renal mass. Mass was previously 1.7cm 1 year ago. Asymptomatic. No history of smoking.  [None] : None

## 2020-10-11 LAB — BACTERIA UR CULT: NORMAL

## 2020-10-12 LAB — URINE CYTOLOGY: NORMAL

## 2020-10-14 ENCOUNTER — NON-APPOINTMENT (OUTPATIENT)
Age: 55
End: 2020-10-14

## 2020-10-14 ENCOUNTER — APPOINTMENT (OUTPATIENT)
Dept: CARDIOLOGY | Facility: CLINIC | Age: 55
End: 2020-10-14
Payer: COMMERCIAL

## 2020-10-14 VITALS
RESPIRATION RATE: 16 BRPM | HEART RATE: 70 BPM | WEIGHT: 233 LBS | DIASTOLIC BLOOD PRESSURE: 82 MMHG | HEIGHT: 71 IN | BODY MASS INDEX: 32.62 KG/M2 | SYSTOLIC BLOOD PRESSURE: 132 MMHG

## 2020-10-14 DIAGNOSIS — Z01.810 ENCOUNTER FOR PREPROCEDURAL CARDIOVASCULAR EXAMINATION: ICD-10-CM

## 2020-10-14 PROCEDURE — 99214 OFFICE O/P EST MOD 30 MIN: CPT

## 2020-10-14 PROCEDURE — 93000 ELECTROCARDIOGRAM COMPLETE: CPT | Mod: NC

## 2020-11-06 ENCOUNTER — APPOINTMENT (OUTPATIENT)
Dept: CARDIOLOGY | Facility: CLINIC | Age: 55
End: 2020-11-06
Payer: COMMERCIAL

## 2020-11-06 VITALS
DIASTOLIC BLOOD PRESSURE: 80 MMHG | SYSTOLIC BLOOD PRESSURE: 130 MMHG | HEIGHT: 71 IN | HEART RATE: 68 BPM | BODY MASS INDEX: 32.9 KG/M2 | RESPIRATION RATE: 15 BRPM | WEIGHT: 235 LBS

## 2020-11-06 DIAGNOSIS — Z01.810 ENCOUNTER FOR PREPROCEDURAL CARDIOVASCULAR EXAMINATION: ICD-10-CM

## 2020-11-06 PROCEDURE — 99072 ADDL SUPL MATRL&STAF TM PHE: CPT

## 2020-11-06 PROCEDURE — 93306 TTE W/DOPPLER COMPLETE: CPT

## 2020-11-06 PROCEDURE — 93015 CV STRESS TEST SUPVJ I&R: CPT

## 2020-11-06 PROCEDURE — ZZZZZ: CPT

## 2020-11-06 PROCEDURE — 99214 OFFICE O/P EST MOD 30 MIN: CPT | Mod: 25

## 2020-11-11 ENCOUNTER — TRANSCRIPTION ENCOUNTER (OUTPATIENT)
Age: 55
End: 2020-11-11

## 2020-11-11 LAB — SARS-COV-2 N GENE NPH QL NAA+PROBE: NOT DETECTED

## 2020-11-11 NOTE — PATIENT PROFILE ADULT - STATED REASON FOR ADMISSION
Pt unable to assist with health history, to every question, pt responded, \"I don't know\", or  \"I never learned to read, you will have to ask my son when he gets here.\"  Son that did come, stated that his brother knew more about the medications that the pt takes, but he \"isn't that reliable.\"  Pharmacy Tech trying to contact pt's son for PTA meds.    lap partial nephrectomy

## 2020-11-11 NOTE — PRE-OP CHECKLIST - 1.
12:30pm patient take aspirin 325mg daily Dr. aLla's pffice notified to give instruction onaspirin whether to take it or not.

## 2020-11-12 ENCOUNTER — APPOINTMENT (OUTPATIENT)
Dept: UROLOGY | Facility: HOSPITAL | Age: 55
End: 2020-11-12

## 2020-11-12 ENCOUNTER — INPATIENT (INPATIENT)
Facility: HOSPITAL | Age: 55
LOS: 2 days | Discharge: HOME | DRG: 657 | End: 2020-11-15
Attending: UROLOGY | Admitting: UROLOGY
Payer: COMMERCIAL

## 2020-11-12 ENCOUNTER — RESULT REVIEW (OUTPATIENT)
Age: 55
End: 2020-11-12

## 2020-11-12 VITALS
WEIGHT: 233.91 LBS | TEMPERATURE: 97 F | HEART RATE: 70 BPM | SYSTOLIC BLOOD PRESSURE: 110 MMHG | OXYGEN SATURATION: 97 % | HEIGHT: 70 IN | RESPIRATION RATE: 16 BRPM | DIASTOLIC BLOOD PRESSURE: 73 MMHG

## 2020-11-12 DIAGNOSIS — Z41.9 ENCOUNTER FOR PROCEDURE FOR PURPOSES OTHER THAN REMEDYING HEALTH STATE, UNSPECIFIED: Chronic | ICD-10-CM

## 2020-11-12 DIAGNOSIS — Z98.890 OTHER SPECIFIED POSTPROCEDURAL STATES: Chronic | ICD-10-CM

## 2020-11-12 LAB
ANION GAP SERPL CALC-SCNC: 10 MMOL/L — SIGNIFICANT CHANGE UP (ref 5–17)
BASE EXCESS BLDA CALC-SCNC: -2.7 MMOL/L — LOW (ref -2–3)
BLD GP AB SCN SERPL QL: NEGATIVE — SIGNIFICANT CHANGE UP
BLD GP AB SCN SERPL QL: NEGATIVE — SIGNIFICANT CHANGE UP
BUN SERPL-MCNC: 16 MG/DL — SIGNIFICANT CHANGE UP (ref 7–23)
CA-I BLDA-SCNC: 1.06 MMOL/L — LOW (ref 1.12–1.3)
CALCIUM SERPL-MCNC: 8.3 MG/DL — LOW (ref 8.4–10.5)
CHLORIDE SERPL-SCNC: 104 MMOL/L — SIGNIFICANT CHANGE UP (ref 96–108)
CO2 SERPL-SCNC: 25 MMOL/L — SIGNIFICANT CHANGE UP (ref 22–31)
COHGB MFR BLDA: 0.3 % — SIGNIFICANT CHANGE UP
CREAT SERPL-MCNC: 1.23 MG/DL — SIGNIFICANT CHANGE UP (ref 0.5–1.3)
GLUCOSE BLDC GLUCOMTR-MCNC: 134 MG/DL — HIGH (ref 70–99)
GLUCOSE SERPL-MCNC: 155 MG/DL — HIGH (ref 70–99)
HCO3 BLDA-SCNC: 23 MMOL/L — SIGNIFICANT CHANGE UP (ref 21–28)
HCT VFR BLD CALC: 37.4 % — LOW (ref 39–50)
HGB BLD-MCNC: 12.7 G/DL — LOW (ref 13–17)
HGB BLDA-MCNC: 12.5 G/DL — LOW (ref 13–17)
MAGNESIUM SERPL-MCNC: 1.9 MG/DL — SIGNIFICANT CHANGE UP (ref 1.6–2.6)
MCHC RBC-ENTMCNC: 29.8 PG — SIGNIFICANT CHANGE UP (ref 27–34)
MCHC RBC-ENTMCNC: 34 GM/DL — SIGNIFICANT CHANGE UP (ref 32–36)
MCV RBC AUTO: 87.8 FL — SIGNIFICANT CHANGE UP (ref 80–100)
METHGB MFR BLDA: 0.5 % — SIGNIFICANT CHANGE UP
NRBC # BLD: 0 /100 WBCS — SIGNIFICANT CHANGE UP (ref 0–0)
O2 CT VFR BLDA CALC: 17.4 ML/DL — SIGNIFICANT CHANGE UP (ref 15–23)
OXYHGB MFR BLDA: 98 % — SIGNIFICANT CHANGE UP (ref 94–100)
PCO2 BLDA: 42 MMHG — SIGNIFICANT CHANGE UP (ref 35–48)
PH BLDA: 7.35 — SIGNIFICANT CHANGE UP (ref 7.35–7.45)
PHOSPHATE SERPL-MCNC: 3.3 MG/DL — SIGNIFICANT CHANGE UP (ref 2.5–4.5)
PLATELET # BLD AUTO: 170 K/UL — SIGNIFICANT CHANGE UP (ref 150–400)
PO2 BLDA: 137 MMHG — HIGH (ref 83–108)
POTASSIUM BLDA-SCNC: 4.6 MMOL/L — SIGNIFICANT CHANGE UP (ref 3.5–4.9)
POTASSIUM SERPL-MCNC: 4.8 MMOL/L — SIGNIFICANT CHANGE UP (ref 3.5–5.3)
POTASSIUM SERPL-SCNC: 4.8 MMOL/L — SIGNIFICANT CHANGE UP (ref 3.5–5.3)
RBC # BLD: 4.26 M/UL — SIGNIFICANT CHANGE UP (ref 4.2–5.8)
RBC # FLD: 12.4 % — SIGNIFICANT CHANGE UP (ref 10.3–14.5)
RH IG SCN BLD-IMP: POSITIVE — SIGNIFICANT CHANGE UP
RH IG SCN BLD-IMP: POSITIVE — SIGNIFICANT CHANGE UP
SAO2 % BLDA: 98 % — SIGNIFICANT CHANGE UP (ref 95–100)
SODIUM BLDA-SCNC: 132 MMOL/L — LOW (ref 138–146)
SODIUM SERPL-SCNC: 139 MMOL/L — SIGNIFICANT CHANGE UP (ref 135–145)
WBC # BLD: 10.03 K/UL — SIGNIFICANT CHANGE UP (ref 3.8–10.5)
WBC # FLD AUTO: 10.03 K/UL — SIGNIFICANT CHANGE UP (ref 3.8–10.5)

## 2020-11-12 PROCEDURE — 71045 X-RAY EXAM CHEST 1 VIEW: CPT | Mod: 26

## 2020-11-12 PROCEDURE — 76998 US GUIDE INTRAOP: CPT | Mod: 26

## 2020-11-12 PROCEDURE — 88307 TISSUE EXAM BY PATHOLOGIST: CPT | Mod: 26

## 2020-11-12 PROCEDURE — 50543 LAPARO PARTIAL NEPHRECTOMY: CPT | Mod: RT

## 2020-11-12 PROCEDURE — 88305 TISSUE EXAM BY PATHOLOGIST: CPT | Mod: 26

## 2020-11-12 PROCEDURE — 49565: CPT

## 2020-11-12 PROCEDURE — 49568: CPT

## 2020-11-12 RX ORDER — HYDROMORPHONE HYDROCHLORIDE 2 MG/ML
0.5 INJECTION INTRAMUSCULAR; INTRAVENOUS; SUBCUTANEOUS ONCE
Refills: 0 | Status: DISCONTINUED | OUTPATIENT
Start: 2020-11-12 | End: 2020-11-12

## 2020-11-12 RX ORDER — ACETAMINOPHEN 500 MG
1000 TABLET ORAL ONCE
Refills: 0 | Status: COMPLETED | OUTPATIENT
Start: 2020-11-12 | End: 2020-11-12

## 2020-11-12 RX ORDER — BUPIVACAINE 13.3 MG/ML
20 INJECTION, SUSPENSION, LIPOSOMAL INFILTRATION ONCE
Refills: 0 | Status: DISCONTINUED | OUTPATIENT
Start: 2020-11-12 | End: 2020-11-12

## 2020-11-12 RX ORDER — SODIUM CHLORIDE 9 MG/ML
1000 INJECTION INTRAMUSCULAR; INTRAVENOUS; SUBCUTANEOUS
Refills: 0 | Status: DISCONTINUED | OUTPATIENT
Start: 2020-11-12 | End: 2020-11-14

## 2020-11-12 RX ORDER — METOPROLOL TARTRATE 50 MG
50 TABLET ORAL DAILY
Refills: 0 | Status: DISCONTINUED | OUTPATIENT
Start: 2020-11-12 | End: 2020-11-12

## 2020-11-12 RX ORDER — TAMSULOSIN HYDROCHLORIDE 0.4 MG/1
0.4 CAPSULE ORAL AT BEDTIME
Refills: 0 | Status: DISCONTINUED | OUTPATIENT
Start: 2020-11-12 | End: 2020-11-15

## 2020-11-12 RX ORDER — TELMISARTAN 20 MG/1
1 TABLET ORAL
Qty: 0 | Refills: 0 | DISCHARGE

## 2020-11-12 RX ORDER — ACETAMINOPHEN 500 MG
650 TABLET ORAL EVERY 6 HOURS
Refills: 0 | Status: DISCONTINUED | OUTPATIENT
Start: 2020-11-12 | End: 2020-11-15

## 2020-11-12 RX ORDER — TAMSULOSIN HYDROCHLORIDE 0.4 MG/1
2 CAPSULE ORAL
Qty: 0 | Refills: 0 | DISCHARGE

## 2020-11-12 RX ORDER — ESOMEPRAZOLE MAGNESIUM 40 MG/1
1 CAPSULE, DELAYED RELEASE ORAL
Qty: 0 | Refills: 0 | DISCHARGE

## 2020-11-12 RX ORDER — METOPROLOL TARTRATE 50 MG
50 TABLET ORAL DAILY
Refills: 0 | Status: DISCONTINUED | OUTPATIENT
Start: 2020-11-13 | End: 2020-11-12

## 2020-11-12 RX ORDER — OXYCODONE AND ACETAMINOPHEN 5; 325 MG/1; MG/1
1 TABLET ORAL EVERY 4 HOURS
Refills: 0 | Status: DISCONTINUED | OUTPATIENT
Start: 2020-11-12 | End: 2020-11-15

## 2020-11-12 RX ORDER — METOPROLOL TARTRATE 50 MG
2 TABLET ORAL
Qty: 0 | Refills: 0 | DISCHARGE

## 2020-11-12 RX ORDER — PANTOPRAZOLE SODIUM 20 MG/1
40 TABLET, DELAYED RELEASE ORAL
Refills: 0 | Status: DISCONTINUED | OUTPATIENT
Start: 2020-11-12 | End: 2020-11-15

## 2020-11-12 RX ORDER — GABAPENTIN 400 MG/1
300 CAPSULE ORAL ONCE
Refills: 0 | Status: COMPLETED | OUTPATIENT
Start: 2020-11-12 | End: 2020-11-12

## 2020-11-12 RX ORDER — ROSUVASTATIN CALCIUM 5 MG/1
1 TABLET ORAL
Qty: 0 | Refills: 0 | DISCHARGE

## 2020-11-12 RX ORDER — ASPIRIN/CALCIUM CARB/MAGNESIUM 324 MG
1 TABLET ORAL
Qty: 0 | Refills: 0 | DISCHARGE

## 2020-11-12 RX ORDER — ENOXAPARIN SODIUM 100 MG/ML
40 INJECTION SUBCUTANEOUS ONCE
Refills: 0 | Status: COMPLETED | OUTPATIENT
Start: 2020-11-12 | End: 2020-11-12

## 2020-11-12 RX ORDER — ATORVASTATIN CALCIUM 80 MG/1
80 TABLET, FILM COATED ORAL AT BEDTIME
Refills: 0 | Status: DISCONTINUED | OUTPATIENT
Start: 2020-11-12 | End: 2020-11-15

## 2020-11-12 RX ORDER — CEFAZOLIN SODIUM 1 G
1000 VIAL (EA) INJECTION EVERY 8 HOURS
Refills: 0 | Status: COMPLETED | OUTPATIENT
Start: 2020-11-12 | End: 2020-11-13

## 2020-11-12 RX ORDER — EZETIMIBE 10 MG/1
1 TABLET ORAL
Qty: 0 | Refills: 0 | DISCHARGE

## 2020-11-12 RX ORDER — METOPROLOL TARTRATE 50 MG
50 TABLET ORAL DAILY
Refills: 0 | Status: DISCONTINUED | OUTPATIENT
Start: 2020-11-12 | End: 2020-11-15

## 2020-11-12 RX ORDER — MAGNESIUM SULFATE 500 MG/ML
1 VIAL (ML) INJECTION ONCE
Refills: 0 | Status: COMPLETED | OUTPATIENT
Start: 2020-11-12 | End: 2020-11-12

## 2020-11-12 RX ORDER — HEPARIN SODIUM 5000 [USP'U]/ML
5000 INJECTION INTRAVENOUS; SUBCUTANEOUS EVERY 8 HOURS
Refills: 0 | Status: DISCONTINUED | OUTPATIENT
Start: 2020-11-12 | End: 2020-11-15

## 2020-11-12 RX ORDER — OXYCODONE AND ACETAMINOPHEN 5; 325 MG/1; MG/1
2 TABLET ORAL EVERY 6 HOURS
Refills: 0 | Status: DISCONTINUED | OUTPATIENT
Start: 2020-11-12 | End: 2020-11-15

## 2020-11-12 RX ORDER — HYDROMORPHONE HYDROCHLORIDE 2 MG/ML
0.5 INJECTION INTRAMUSCULAR; INTRAVENOUS; SUBCUTANEOUS ONCE
Refills: 0 | Status: DISCONTINUED | OUTPATIENT
Start: 2020-11-12 | End: 2020-11-13

## 2020-11-12 RX ADMIN — ENOXAPARIN SODIUM 40 MILLIGRAM(S): 100 INJECTION SUBCUTANEOUS at 08:02

## 2020-11-12 RX ADMIN — HYDROMORPHONE HYDROCHLORIDE 0.5 MILLIGRAM(S): 2 INJECTION INTRAMUSCULAR; INTRAVENOUS; SUBCUTANEOUS at 19:18

## 2020-11-12 RX ADMIN — OXYCODONE AND ACETAMINOPHEN 2 TABLET(S): 5; 325 TABLET ORAL at 15:46

## 2020-11-12 RX ADMIN — HYDROMORPHONE HYDROCHLORIDE 0.5 MILLIGRAM(S): 2 INJECTION INTRAMUSCULAR; INTRAVENOUS; SUBCUTANEOUS at 18:55

## 2020-11-12 RX ADMIN — Medication 1000 MILLIGRAM(S): at 08:02

## 2020-11-12 RX ADMIN — ATORVASTATIN CALCIUM 80 MILLIGRAM(S): 80 TABLET, FILM COATED ORAL at 21:17

## 2020-11-12 RX ADMIN — HEPARIN SODIUM 5000 UNIT(S): 5000 INJECTION INTRAVENOUS; SUBCUTANEOUS at 21:17

## 2020-11-12 RX ADMIN — OXYCODONE AND ACETAMINOPHEN 2 TABLET(S): 5; 325 TABLET ORAL at 15:42

## 2020-11-12 RX ADMIN — Medication 100 GRAM(S): at 14:26

## 2020-11-12 RX ADMIN — OXYCODONE AND ACETAMINOPHEN 2 TABLET(S): 5; 325 TABLET ORAL at 21:16

## 2020-11-12 RX ADMIN — GABAPENTIN 300 MILLIGRAM(S): 400 CAPSULE ORAL at 08:03

## 2020-11-12 RX ADMIN — HEPARIN SODIUM 5000 UNIT(S): 5000 INJECTION INTRAVENOUS; SUBCUTANEOUS at 14:26

## 2020-11-12 RX ADMIN — SODIUM CHLORIDE 100 MILLILITER(S): 9 INJECTION INTRAMUSCULAR; INTRAVENOUS; SUBCUTANEOUS at 15:21

## 2020-11-12 RX ADMIN — OXYCODONE AND ACETAMINOPHEN 2 TABLET(S): 5; 325 TABLET ORAL at 21:45

## 2020-11-12 RX ADMIN — TAMSULOSIN HYDROCHLORIDE 0.4 MILLIGRAM(S): 0.4 CAPSULE ORAL at 21:17

## 2020-11-12 RX ADMIN — Medication 100 MILLIGRAM(S): at 16:51

## 2020-11-12 NOTE — PACU DISCHARGE NOTE - COMMENTS
REport given to Cassie FERNANDEZ. Dressings to abdomen dry and intact. transported to room on oxygen in bed accompanied by PCAs. IV sites with no signs of infiltration.

## 2020-11-12 NOTE — PROGRESS NOTE ADULT - SUBJECTIVE AND OBJECTIVE BOX
POST OP NOTE:    Patient denies any pain at this time,  still fairly sedated, responds to verbal commands, but falls asleep shortly there after.       T(C): 35.6 (11-12-20 @ 12:54), Max: 36 (11-12-20 @ 07:44)  HR: 68 (11-12-20 @ 13:09) (68 - 77)  BP: 132/84 (11-12-20 @ 13:09) (110/73 - 132/84)  RR: 20 (11-12-20 @ 13:09) (16 - 20)  SpO2: 96% (11-12-20 @ 13:09) (90% - 97%)    GEN: NAD  ABD: Softly distended, appropriate TTP, incisions c/d/i, kimberly intact with blood output  : washington catheter in place with clear urine draining        A/P 56 yo m with right renal mass s/p right robotic nephrectomy  1) Advance diet  2) cont IVF until tolerating PO  3)keep washington monitor output  4) IS and OOB

## 2020-11-12 NOTE — PROGRESS NOTE ADULT - SUBJECTIVE AND OBJECTIVE BOX
Surgery Post-Op Note    Procedure: Ventral hernia repair     Diagnosis/Indication: Ventral hernia    Surgeon: Dr. Norman    S: Pt has no complaints. No nausea or vomiting. Denies any abdominal pain. Denies SOB, CP.     O:  T(C): 36.9 (11-12-20 @ 20:51), Max: 37.3 (11-12-20 @ 18:24)  T(F): 98.5 (11-12-20 @ 20:51), Max: 99.1 (11-12-20 @ 18:24)  HR: 79 (11-12-20 @ 20:51) (66 - 79)  BP: 109/68 (11-12-20 @ 20:51) (109/68 - 135/84)  RR: 15 (11-12-20 @ 20:51) (14 - 16)  SpO2: 95% (11-12-20 @ 20:51) (88% - 95%)  Wt(kg): --                        12.7   10.03 )-----------( 170      ( 12 Nov 2020 13:14 )             37.4     11-12    139  |  104  |  16  ----------------------------<  155<H>  4.8   |  25  |  1.23    Ca    8.3<L>      12 Nov 2020 13:14  Phos  3.3     11-12  Mg     1.9     11-12        Gen: NAD, resting comfortably in bed  C/V: NSR  Pulm: Nonlabored breathing, no respiratory distress  Abd: soft, NT/ND. Incisions clean dry and intact.   Extrem: WWP, no calf edema, SCDs in place      A/P: 55yMale s/p above procedure, doing well in the postoperative period with no complaints and unremarkable physical exam.     Plan: Care per primary team, general surgery will continue to follow.

## 2020-11-12 NOTE — BRIEF OPERATIVE NOTE - NSICDXBRIEFPROCEDURE_GEN_ALL_CORE_FT
PROCEDURES:  Repair, ventral hernia 12-Nov-2020 12:48:44  Patricio Bishop  Robot-assisted partial nephrectomy 12-Nov-2020 12:48:35  Patricio Bishop

## 2020-11-13 DIAGNOSIS — N28.89 OTHER SPECIFIED DISORDERS OF KIDNEY AND URETER: ICD-10-CM

## 2020-11-13 DIAGNOSIS — K21.9 GASTRO-ESOPHAGEAL REFLUX DISEASE WITHOUT ESOPHAGITIS: ICD-10-CM

## 2020-11-13 DIAGNOSIS — E78.5 HYPERLIPIDEMIA, UNSPECIFIED: ICD-10-CM

## 2020-11-13 DIAGNOSIS — N40.0 BENIGN PROSTATIC HYPERPLASIA WITHOUT LOWER URINARY TRACT SYMPTOMS: ICD-10-CM

## 2020-11-13 DIAGNOSIS — K46.9 UNSPECIFIED ABDOMINAL HERNIA WITHOUT OBSTRUCTION OR GANGRENE: ICD-10-CM

## 2020-11-13 DIAGNOSIS — I10 ESSENTIAL (PRIMARY) HYPERTENSION: ICD-10-CM

## 2020-11-13 LAB
ANION GAP SERPL CALC-SCNC: 13 MMOL/L — SIGNIFICANT CHANGE UP (ref 5–17)
BUN SERPL-MCNC: 26 MG/DL — HIGH (ref 7–23)
CALCIUM SERPL-MCNC: 8.1 MG/DL — LOW (ref 8.4–10.5)
CHLORIDE SERPL-SCNC: 103 MMOL/L — SIGNIFICANT CHANGE UP (ref 96–108)
CO2 SERPL-SCNC: 24 MMOL/L — SIGNIFICANT CHANGE UP (ref 22–31)
CREAT FLD-MCNC: 1.3 MG/DL — SIGNIFICANT CHANGE UP
CREAT SERPL-MCNC: 1.42 MG/DL — HIGH (ref 0.5–1.3)
GLUCOSE SERPL-MCNC: 135 MG/DL — HIGH (ref 70–99)
HCT VFR BLD CALC: 35.7 % — LOW (ref 39–50)
HGB BLD-MCNC: 11.7 G/DL — LOW (ref 13–17)
MAGNESIUM SERPL-MCNC: 2.2 MG/DL — SIGNIFICANT CHANGE UP (ref 1.6–2.6)
MCHC RBC-ENTMCNC: 29.5 PG — SIGNIFICANT CHANGE UP (ref 27–34)
MCHC RBC-ENTMCNC: 32.8 GM/DL — SIGNIFICANT CHANGE UP (ref 32–36)
MCV RBC AUTO: 90.2 FL — SIGNIFICANT CHANGE UP (ref 80–100)
NRBC # BLD: 0 /100 WBCS — SIGNIFICANT CHANGE UP (ref 0–0)
PHOSPHATE SERPL-MCNC: 4.4 MG/DL — SIGNIFICANT CHANGE UP (ref 2.5–4.5)
PLATELET # BLD AUTO: 200 K/UL — SIGNIFICANT CHANGE UP (ref 150–400)
POTASSIUM SERPL-MCNC: 4.7 MMOL/L — SIGNIFICANT CHANGE UP (ref 3.5–5.3)
POTASSIUM SERPL-SCNC: 4.7 MMOL/L — SIGNIFICANT CHANGE UP (ref 3.5–5.3)
RBC # BLD: 3.96 M/UL — LOW (ref 4.2–5.8)
RBC # FLD: 12.4 % — SIGNIFICANT CHANGE UP (ref 10.3–14.5)
SODIUM SERPL-SCNC: 140 MMOL/L — SIGNIFICANT CHANGE UP (ref 135–145)
WBC # BLD: 14.65 K/UL — HIGH (ref 3.8–10.5)
WBC # FLD AUTO: 14.65 K/UL — HIGH (ref 3.8–10.5)

## 2020-11-13 PROCEDURE — 71045 X-RAY EXAM CHEST 1 VIEW: CPT | Mod: 26

## 2020-11-13 RX ORDER — OXYBUTYNIN CHLORIDE 5 MG
5 TABLET ORAL EVERY 8 HOURS
Refills: 0 | Status: DISCONTINUED | OUTPATIENT
Start: 2020-11-13 | End: 2020-11-15

## 2020-11-13 RX ORDER — ACETAMINOPHEN 500 MG
1000 TABLET ORAL ONCE
Refills: 0 | Status: COMPLETED | OUTPATIENT
Start: 2020-11-13 | End: 2020-11-13

## 2020-11-13 RX ORDER — ONDANSETRON 8 MG/1
4 TABLET, FILM COATED ORAL ONCE
Refills: 0 | Status: DISCONTINUED | OUTPATIENT
Start: 2020-11-13 | End: 2020-11-15

## 2020-11-13 RX ORDER — KETOROLAC TROMETHAMINE 30 MG/ML
15 SYRINGE (ML) INJECTION EVERY 6 HOURS
Refills: 0 | Status: DISCONTINUED | OUTPATIENT
Start: 2020-11-13 | End: 2020-11-15

## 2020-11-13 RX ADMIN — HYDROMORPHONE HYDROCHLORIDE 0.5 MILLIGRAM(S): 2 INJECTION INTRAMUSCULAR; INTRAVENOUS; SUBCUTANEOUS at 00:48

## 2020-11-13 RX ADMIN — Medication 400 MILLIGRAM(S): at 23:04

## 2020-11-13 RX ADMIN — Medication 15 MILLIGRAM(S): at 14:04

## 2020-11-13 RX ADMIN — Medication 15 MILLIGRAM(S): at 19:57

## 2020-11-13 RX ADMIN — Medication 5 MILLIGRAM(S): at 23:04

## 2020-11-13 RX ADMIN — OXYCODONE AND ACETAMINOPHEN 2 TABLET(S): 5; 325 TABLET ORAL at 22:27

## 2020-11-13 RX ADMIN — Medication 650 MILLIGRAM(S): at 07:00

## 2020-11-13 RX ADMIN — ATORVASTATIN CALCIUM 80 MILLIGRAM(S): 80 TABLET, FILM COATED ORAL at 21:25

## 2020-11-13 RX ADMIN — OXYCODONE AND ACETAMINOPHEN 2 TABLET(S): 5; 325 TABLET ORAL at 21:27

## 2020-11-13 RX ADMIN — TAMSULOSIN HYDROCHLORIDE 0.4 MILLIGRAM(S): 0.4 CAPSULE ORAL at 21:25

## 2020-11-13 RX ADMIN — HYDROMORPHONE HYDROCHLORIDE 0.5 MILLIGRAM(S): 2 INJECTION INTRAMUSCULAR; INTRAVENOUS; SUBCUTANEOUS at 01:05

## 2020-11-13 RX ADMIN — Medication 15 MILLIGRAM(S): at 13:34

## 2020-11-13 RX ADMIN — Medication 5 MILLIGRAM(S): at 10:42

## 2020-11-13 RX ADMIN — Medication 1000 MILLIGRAM(S): at 23:30

## 2020-11-13 RX ADMIN — HEPARIN SODIUM 5000 UNIT(S): 5000 INJECTION INTRAVENOUS; SUBCUTANEOUS at 14:24

## 2020-11-13 RX ADMIN — Medication 5 MILLIGRAM(S): at 00:41

## 2020-11-13 RX ADMIN — Medication 650 MILLIGRAM(S): at 06:31

## 2020-11-13 RX ADMIN — HEPARIN SODIUM 5000 UNIT(S): 5000 INJECTION INTRAVENOUS; SUBCUTANEOUS at 21:25

## 2020-11-13 RX ADMIN — PANTOPRAZOLE SODIUM 40 MILLIGRAM(S): 20 TABLET, DELAYED RELEASE ORAL at 05:09

## 2020-11-13 RX ADMIN — Medication 100 MILLIGRAM(S): at 00:41

## 2020-11-13 RX ADMIN — OXYCODONE AND ACETAMINOPHEN 2 TABLET(S): 5; 325 TABLET ORAL at 05:09

## 2020-11-13 RX ADMIN — HEPARIN SODIUM 5000 UNIT(S): 5000 INJECTION INTRAVENOUS; SUBCUTANEOUS at 05:09

## 2020-11-13 NOTE — PHYSICAL THERAPY INITIAL EVALUATION ADULT - CRITERIA FOR SKILLED THERAPEUTIC INTERVENTIONS
rehab potential/therapy frequency/functional limitations in following categories/impairments found/anticipated discharge recommendation

## 2020-11-13 NOTE — PROGRESS NOTE ADULT - ASSESSMENT
56 yo m with right renal mass s/p right robotic nephrectomy.    Plan  - Continue CLD  - Pain control  - OOB/AMB TID  - Continue care per primary team.

## 2020-11-13 NOTE — PROGRESS NOTE ADULT - SUBJECTIVE AND OBJECTIVE BOX
INTERVAL HPI/OVERNIGHT EVENTS:  No acute events overnight. Patient complaining of some right abdominal pain post-operatively.     VITALS:    T(F): 98.4 (11-13-20 @ 00:31), Max: 99.1 (11-12-20 @ 18:24)  HR: 78 (11-13-20 @ 00:31) (63 - 79)  BP: 113/72 (11-13-20 @ 00:31) (109/68 - 135/84)  RR: 17 (11-13-20 @ 00:31) (6 - 20)  SpO2: 94% (11-13-20 @ 00:31) (88% - 100%)  Wt(kg): --    I&O's Detail    12 Nov 2020 07:01  -  13 Nov 2020 04:53  --------------------------------------------------------  IN:    IV PiggyBack: 100 mL    Oral Fluid: 360 mL    sodium chloride 0.9%: 1300 mL  Total IN: 1760 mL    OUT:    Bulb (mL): 75 mL    Indwelling Catheter - Urethral (mL): 770 mL  Total OUT: 845 mL    Total NET: 915 mL          MEDICATIONS:    ANTIBIOTICS:      PAIN CONTROL:  acetaminophen   Tablet .. 650 milliGRAM(s) Oral every 6 hours PRN  oxycodone    5 mG/acetaminophen 325 mG 1 Tablet(s) Oral every 4 hours PRN  oxycodone    5 mG/acetaminophen 325 mG 2 Tablet(s) Oral every 6 hours PRN       MEDS:  oxybutynin 5 milliGRAM(s) Oral every 8 hours PRN      HEME/ONC  heparin   Injectable 5000 Unit(s) SubCutaneous every 8 hours        PHYSICAL EXAM:  General: No acute distress.  Alert and Oriented  pulm: non-labored breathing on 4L nasal cannula, no respiratory distress   Abdominal Exam: soft, appropriately tender, non-distended. HAY drain in place draining serosanguinous fluid    Exam:  washington catheter in place draining yellow urine       LABS:                        12.7   10.03 )-----------( 170      ( 12 Nov 2020 13:14 )             37.4     11-12    139  |  104  |  16  ----------------------------<  155<H>  4.8   |  25  |  1.23    Ca    8.3<L>      12 Nov 2020 13:14  Phos  3.3     11-12  Mg     1.9     11-12

## 2020-11-13 NOTE — PROGRESS NOTE ADULT - SUBJECTIVE AND OBJECTIVE BOX
24 hr events: NAEON. Afebrile, VSS.    SUBJECTIVE: Patient assessed by Quan Tucker. Laying in bed in mild acute distress. C/o abdominal pain at the incision sites. Had 1 brief episode of nausea but did not vomit yet. No BM or gas so far.    Vital Signs Last 24 Hrs  T(C): 37.2 (13 Nov 2020 05:17), Max: 37.3 (12 Nov 2020 18:24)  T(F): 98.9 (13 Nov 2020 05:17), Max: 99.1 (12 Nov 2020 18:24)  HR: 76 (13 Nov 2020 05:17) (63 - 79)  BP: 110/69 (13 Nov 2020 05:17) (109/68 - 135/84)  BP(mean): 97 (12 Nov 2020 17:09) (90 - 102)  RR: 17 (13 Nov 2020 05:17) (6 - 20)  SpO2: 94% (13 Nov 2020 05:17) (88% - 100%)      Physical Exam:  General: mild acute distress  Pulmonary: Nonlabored breathing, no respiratory distress  Cardiovascular: NSR  Abdominal: mildly rigid, tender at surgical sites, not peritonitic.  Extremities: WWP.      I&O's Summary    12 Nov 2020 07:01  -  13 Nov 2020 07:00  --------------------------------------------------------  IN: 1960 mL / OUT: 1395 mL / NET: 565 mL        LABS:                        11.7   14.65 )-----------( 200      ( 13 Nov 2020 06:02 )             35.7     11-13    140  |  103  |  26<H>  ----------------------------<  135<H>  4.7   |  24  |  1.42<H>    Ca    8.1<L>      13 Nov 2020 06:02  Phos  4.4     11-13  Mg     2.2     11-13          CAPILLARY BLOOD GLUCOSE      POCT Blood Glucose.: 134 mg/dL (12 Nov 2020 12:10)        RADIOLOGY & ADDITIONAL STUDIES:

## 2020-11-13 NOTE — PROGRESS NOTE ADULT - NUTRITIONAL ASSESSMENT
54 yo m with right renal mass s/p right robotic nephrectomy. POD1 without any acute complaints currently.

## 2020-11-14 ENCOUNTER — TRANSCRIPTION ENCOUNTER (OUTPATIENT)
Age: 55
End: 2020-11-14

## 2020-11-14 DIAGNOSIS — J98.11 ATELECTASIS: ICD-10-CM

## 2020-11-14 DIAGNOSIS — J96.11 CHRONIC RESPIRATORY FAILURE WITH HYPOXIA: ICD-10-CM

## 2020-11-14 LAB
ANION GAP SERPL CALC-SCNC: 12 MMOL/L — SIGNIFICANT CHANGE UP (ref 5–17)
BUN SERPL-MCNC: 25 MG/DL — HIGH (ref 7–23)
CALCIUM SERPL-MCNC: 8.2 MG/DL — LOW (ref 8.4–10.5)
CHLORIDE SERPL-SCNC: 102 MMOL/L — SIGNIFICANT CHANGE UP (ref 96–108)
CO2 SERPL-SCNC: 23 MMOL/L — SIGNIFICANT CHANGE UP (ref 22–31)
CREAT SERPL-MCNC: 1.3 MG/DL — SIGNIFICANT CHANGE UP (ref 0.5–1.3)
GLUCOSE SERPL-MCNC: 100 MG/DL — HIGH (ref 70–99)
HCT VFR BLD CALC: 37.7 % — LOW (ref 39–50)
HGB BLD-MCNC: 12.4 G/DL — LOW (ref 13–17)
MAGNESIUM SERPL-MCNC: 2.3 MG/DL — SIGNIFICANT CHANGE UP (ref 1.6–2.6)
MCHC RBC-ENTMCNC: 30.4 PG — SIGNIFICANT CHANGE UP (ref 27–34)
MCHC RBC-ENTMCNC: 32.9 GM/DL — SIGNIFICANT CHANGE UP (ref 32–36)
MCV RBC AUTO: 92.4 FL — SIGNIFICANT CHANGE UP (ref 80–100)
NRBC # BLD: 0 /100 WBCS — SIGNIFICANT CHANGE UP (ref 0–0)
PHOSPHATE SERPL-MCNC: 1.9 MG/DL — LOW (ref 2.5–4.5)
PLATELET # BLD AUTO: 162 K/UL — SIGNIFICANT CHANGE UP (ref 150–400)
POTASSIUM SERPL-MCNC: 3.9 MMOL/L — SIGNIFICANT CHANGE UP (ref 3.5–5.3)
POTASSIUM SERPL-SCNC: 3.9 MMOL/L — SIGNIFICANT CHANGE UP (ref 3.5–5.3)
RBC # BLD: 4.08 M/UL — LOW (ref 4.2–5.8)
RBC # FLD: 12.6 % — SIGNIFICANT CHANGE UP (ref 10.3–14.5)
SODIUM SERPL-SCNC: 137 MMOL/L — SIGNIFICANT CHANGE UP (ref 135–145)
WBC # BLD: 9.72 K/UL — SIGNIFICANT CHANGE UP (ref 3.8–10.5)
WBC # FLD AUTO: 9.72 K/UL — SIGNIFICANT CHANGE UP (ref 3.8–10.5)

## 2020-11-14 PROCEDURE — 71045 X-RAY EXAM CHEST 1 VIEW: CPT | Mod: 26

## 2020-11-14 PROCEDURE — 99223 1ST HOSP IP/OBS HIGH 75: CPT | Mod: GC

## 2020-11-14 RX ORDER — MULTIVIT WITH MIN/MFOLATE/K2 340-15/3 G
1 POWDER (GRAM) ORAL ONCE
Refills: 0 | Status: COMPLETED | OUTPATIENT
Start: 2020-11-14 | End: 2020-11-14

## 2020-11-14 RX ADMIN — Medication 10 MILLIGRAM(S): at 10:00

## 2020-11-14 RX ADMIN — HEPARIN SODIUM 5000 UNIT(S): 5000 INJECTION INTRAVENOUS; SUBCUTANEOUS at 05:26

## 2020-11-14 RX ADMIN — TAMSULOSIN HYDROCHLORIDE 0.4 MILLIGRAM(S): 0.4 CAPSULE ORAL at 21:57

## 2020-11-14 RX ADMIN — Medication 1 BOTTLE: at 10:00

## 2020-11-14 RX ADMIN — PANTOPRAZOLE SODIUM 40 MILLIGRAM(S): 20 TABLET, DELAYED RELEASE ORAL at 05:26

## 2020-11-14 RX ADMIN — Medication 15 MILLIGRAM(S): at 12:04

## 2020-11-14 RX ADMIN — ATORVASTATIN CALCIUM 80 MILLIGRAM(S): 80 TABLET, FILM COATED ORAL at 21:57

## 2020-11-14 RX ADMIN — Medication 50 MILLIGRAM(S): at 05:26

## 2020-11-14 RX ADMIN — HEPARIN SODIUM 5000 UNIT(S): 5000 INJECTION INTRAVENOUS; SUBCUTANEOUS at 21:57

## 2020-11-14 RX ADMIN — Medication 15 MILLIGRAM(S): at 00:00

## 2020-11-14 RX ADMIN — Medication 15 MILLIGRAM(S): at 11:18

## 2020-11-14 RX ADMIN — Medication 15 MILLIGRAM(S): at 17:57

## 2020-11-14 RX ADMIN — Medication 15 MILLIGRAM(S): at 17:31

## 2020-11-14 RX ADMIN — HEPARIN SODIUM 5000 UNIT(S): 5000 INJECTION INTRAVENOUS; SUBCUTANEOUS at 13:15

## 2020-11-14 RX ADMIN — Medication 15 MILLIGRAM(S): at 00:15

## 2020-11-14 RX ADMIN — Medication 15 MILLIGRAM(S): at 05:26

## 2020-11-14 NOTE — DISCHARGE NOTE PROVIDER - NSDCFUADDINST_GEN_ALL_CORE_FT
You may take 500mg extra strength Tylenol every 8 hrs as needed for pain.     Advance diet as tolerated, activity as tolerated. No heavy lifting or straining. Stay well hydrated. If fever >100.4 or any change or worsening of symptoms please call doctor or report to ED. Make follow up appointment with Dr Aguirre.    -+/*/Drink plenty of water to flush out the bladder.   To avoid constipation, take a stool softener as needed.   Blood in your urine. It's normal to see blood right after surgery. Urination might be painful, or you might have a sense of urgency or frequent need to urinate. This is normal. call the office or go to the ER.   You may take 500mg extra strength Tylenol every 8 hrs as needed for pain.     Advance diet as tolerated, activity as tolerated. No heavy lifting or straining. Stay well hydrated. If fever >100.4 or any change or worsening of symptoms please call doctor or report to ED. Make follow up appointment with Dr Aguirre.    Drink plenty of water to flush out the bladder.     To avoid constipation, take a stool softener as needed. (Miralax over the counter can be used)    Blood in your urine. It's normal to see blood right after surgery. Urination might be painful, or you might have a sense of urgency or frequent need to urinate. This is normal. call the office or go to the ER.   You can start your ASA 325mg tomorrow    You may take 500mg extra strength Tylenol every 8 hrs as needed for pain.     Advance diet as tolerated, activity as tolerated. No heavy lifting or straining. Stay well hydrated. If fever >100.4 or any change or worsening of symptoms please call doctor or report to ED. Make follow up appointment with Dr Aguirre.    Drink plenty of water to flush out the bladder.     To avoid constipation, take a stool softener as needed. (Miralax over the counter can be used)    Blood in your urine. It's normal to see blood right after surgery. Urination might be painful, or you might have a sense of urgency or frequent need to urinate. This is normal. call the office or go to the ER.

## 2020-11-14 NOTE — DISCHARGE NOTE PROVIDER - HOSPITAL COURSE
Patient is a 55y old  Male who presents with a chief complaint of right renal mass (13 Nov 2020 04:53)      HPI: Patient is a 55M w/ Right renal mass s/p Right partial robotic assisted nephrectomy.  Patient tolerated procedure today, VSS, HDS, and afebrile.  he meets all discharge criteria, cleared for d/c to home today.      Vital Signs Last 24 Hrs  T(C): 37 (14 Nov 2020 08:35), Max: 37.1 (13 Nov 2020 21:22)  T(F): 98.6 (14 Nov 2020 08:35), Max: 98.7 (13 Nov 2020 21:22)  HR: 61 (14 Nov 2020 08:35) (61 - 70)  BP: 136/78 (14 Nov 2020 08:35) (109/69 - 136/78)  BP(mean): --  RR: 18 (14 Nov 2020 08:35) (18 - 20)  SpO2: 92% (14 Nov 2020 08:35) (91% - 92%)  I&O's Summary    13 Nov 2020 07:01  -  14 Nov 2020 07:00  --------------------------------------------------------  IN: 1640 mL / OUT: 1690 mL / NET: -50 mL    14 Nov 2020 07:01  -  14 Nov 2020 13:43  --------------------------------------------------------  IN: 1000 mL / OUT: 445 mL / NET: 555 mL      LABS:                        12.4   9.72  )-----------( 162      ( 14 Nov 2020 07:15 )             37.7     11-14    137  |  102  |  25<H>  ----------------------------<  100<H>  3.9   |  23  |  1.30    Ca    8.2<L>      14 Nov 2020 07:15  Phos  1.9     11-14  Mg     2.3     11-14        Cultures      A/P 55M w/ Right renal mass s/p Right partial robotic assisted nephrectomy. He has been ambulating and pain has been minimal. Patient tolerated procedure well, VSS, HDS, and afebrile.  he meets all discharge criteria, cleared for d/c to home today.

## 2020-11-14 NOTE — PROGRESS NOTE ADULT - SUBJECTIVE AND OBJECTIVE BOX
AM Note    No acute events overnight.      Vital Signs Last 24 Hrs  T(C): 36.9 (11-14-20 @ 05:07), Max: 37.1 (11-13-20 @ 08:14)  T(F): 98.5 (11-14-20 @ 05:07), Max: 98.7 (11-13-20 @ 08:14)  HR: 64 (11-14-20 @ 05:07) (64 - 71)  BP: 114/71 (11-14-20 @ 05:07) (109/69 - 129/73)  BP(mean): --  RR: 19 (11-14-20 @ 05:07) (17 - 20)  SpO2: 91% (11-14-20 @ 05:07) (91% - 94%)     13 Nov 2020 06:02    140    |  103    |  26     ----------------------------<  135    4.7     |  24     |  1.42     Ca    8.1        13 Nov 2020 06:02  Phos  4.4       13 Nov 2020 06:02  Mg     2.2       13 Nov 2020 06:02                            11.7   14.65 )-----------( 200      ( 13 Nov 2020 06:02 )             35.7         I&O's Summary    12 Nov 2020 07:01  -  13 Nov 2020 07:00  --------------------------------------------------------  IN: 1960 mL / OUT: 1395 mL / NET: 565 mL    13 Nov 2020 07:01  -  14 Nov 2020 06:30  --------------------------------------------------------  IN: 240 mL / OUT: 1690 mL / NET: -1450 mL          PHYSICAL EXAM:    General: No acute distress.  Alert and Oriented  pulm: non-labored breathing on 3L nasal cannula, no respiratory distress   Abdominal Exam: soft, appropriately tender, non-distended. HAY drain in place draining serosanguinous fluid    Exam:  washington catheter in place draining yellow urine

## 2020-11-14 NOTE — DISCHARGE NOTE PROVIDER - CARE PROVIDER_API CALL
Jimi Aguirre)  Urology  [address], [facility], operated by [facility], [place] [address]  Phone: [phone]  Fax: [phone]  Follow Up Time: 1 week

## 2020-11-14 NOTE — CONSULT NOTE ADULT - SUBJECTIVE AND OBJECTIVE BOX
Patient is a 55y old  Male who presents with a chief complaint of right renal mass s/p  (14 Nov 2020 13:42)      HPI:  he is sob and not able to take a deep breath    PAST MEDICAL & SURGICAL HISTORY:  BPH (benign prostatic hyperplasia)    HLD (hyperlipidemia)    HTN (hypertension)    Hernia    GERD (gastroesophageal reflux disease)    CAD (coronary artery disease)  MI 2016    Renal mass, right    Elective surgery  sacroiliac fusion    Elective surgery  hanstring release tendon repair    History of hernia repair  bilateral with meshX2    Elective surgery  CABG X 3vessels        FAMILY HISTORY:      SOCIAL HISTORY:  Smoking Status: [ ] Current, [ ] Former, [ ] Never  Pack Years:    MEDICATIONS:  Pulmonary:    Antimicrobials:    Anticoagulants:  heparin   Injectable 5000 Unit(s) SubCutaneous every 8 hours    Onc:    GI/:  oxybutynin 5 milliGRAM(s) Oral every 8 hours PRN  pantoprazole    Tablet 40 milliGRAM(s) Oral before breakfast    Endocrine:  atorvastatin 80 milliGRAM(s) Oral at bedtime    Cardiac:  metoprolol succinate ER 50 milliGRAM(s) Oral daily  tamsulosin 0.4 milliGRAM(s) Oral at bedtime    Other Medications:  acetaminophen   Tablet .. 650 milliGRAM(s) Oral every 6 hours PRN  ketorolac   Injectable 15 milliGRAM(s) IV Push every 6 hours  ondansetron Injectable 4 milliGRAM(s) IV Push once  oxycodone    5 mG/acetaminophen 325 mG 1 Tablet(s) Oral every 4 hours PRN  oxycodone    5 mG/acetaminophen 325 mG 2 Tablet(s) Oral every 6 hours PRN      Allergies    No Known Allergies    Intolerances    Milk (Vomiting)      Review of Systems:   •	General: negative  •	Skin/Breast: negative  •	Ophthalmologic: negative  •	ENMT: negative  •	Respiratory and Thorax: negative  •	Cardiovascular: negative  •	Gastrointestinal: negative  •	Genitourinary: negative  •	Musculoskeletal: negative  •	Neurological: negative  •	Psychiatric: negative  •	Hematology/Lymphatics: negative  •	Endocrine: negative  •	Allergic/Immunologic: negative    Physical Exam:   • Constitutional:	Well-developed, well nourished  • Eyes:	EOMI; PERRL; no drainage or redness  • ENMT:	No oral lesions; no gross abnormalities  • Neck	No bruits; no thyromegaly or nodules  • Breasts:	not examined  • Back:	No deformity or limitation of movement  • Respiratory:	Breath Sounds equal & clear to percussion & auscultation, no accessory muscle use  • Cardiovascular:	Regular rate & rhythm, normal S1, S2; no murmurs, gallops or rubs; no S3, S4  • Gastrointestinal:	Soft, non-tender, no hepatosplenomegaly, normal bowel sounds  • Genitourinary:	not examined  • Rectal: not examined  • Extremities:	No cyanosis, clubbing or edema  • Vascular:	Equal and normal pulses (carotid, femoral, dorsalis pedis)  • Neurologica:l	not examined  • Skin:	No lesions; no rash  • Lymph Nodes:	No lymphadedenopathy  • Musculoskeletal:	No joint pain, swelling or deformity; no limitation of movement      Vital Signs Last 24 Hrs  T(C): 37.2 (14 Nov 2020 14:58), Max: 37.2 (14 Nov 2020 14:58)  T(F): 98.9 (14 Nov 2020 14:58), Max: 98.9 (14 Nov 2020 14:58)  HR: 69 (14 Nov 2020 14:58) (61 - 70)  BP: 138/74 (14 Nov 2020 14:58) (114/71 - 138/74)  BP(mean): --  RR: 18 (14 Nov 2020 14:58) (18 - 19)  SpO2: 94% (14 Nov 2020 14:58) (91% - 94%)    11-13 @ 07:01  -  11-14 @ 07:00  --------------------------------------------------------  IN: 1640 mL / OUT: 1690 mL / NET: -50 mL    11-14 @ 07:01  -  11-14 @ 18:24  --------------------------------------------------------  IN: 1000 mL / OUT: 470 mL / NET: 530 mL          LABS:      CBC Full  -  ( 14 Nov 2020 07:15 )  WBC Count : 9.72 K/uL  RBC Count : 4.08 M/uL  Hemoglobin : 12.4 g/dL  Hematocrit : 37.7 %  Platelet Count - Automated : 162 K/uL  Mean Cell Volume : 92.4 fl  Mean Cell Hemoglobin : 30.4 pg  Mean Cell Hemoglobin Concentration : 32.9 gm/dL  Auto Neutrophil # : x  Auto Lymphocyte # : x  Auto Monocyte # : x  Auto Eosinophil # : x  Auto Basophil # : x  Auto Neutrophil % : x  Auto Lymphocyte % : x  Auto Monocyte % : x  Auto Eosinophil % : x  Auto Basophil % : x    11-14    137  |  102  |  25<H>  ----------------------------<  100<H>  3.9   |  23  |  1.30    Ca    8.2<L>      14 Nov 2020 07:15  Phos  1.9     11-14  Mg     2.3     11-14    < from: Xray Chest 1 View- PORTABLE-Urgent (Xray Chest 1 View- PORTABLE-Urgent .) (11.13.20 @ 07:52) >  EXAM:  XR CHEST PORTABLE URGENT 1V                          PROCEDURE DATE:  11/13/2020          INTERPRETATION:  Clinical history/reason for exam: Postop.    Frontal view/low lung volumes.    COMPARISON: November 12, 2020.    Findings/  impression: Stable cardiomegaly,, status post median sternotomy. Bilateral opacities/pleural effusions.. Stable bony structures.    < end of copied text >                      RADIOLOGY & ADDITIONAL STUDIES (The following images were personally reviewed):

## 2020-11-14 NOTE — DISCHARGE NOTE PROVIDER - NSDCCPCAREPLAN_GEN_ALL_CORE_FT
PRINCIPAL DISCHARGE DIAGNOSIS  Diagnosis: Status post nephrectomy  Assessment and Plan of Treatment: s/p Partial Nephrectomy      SECONDARY DISCHARGE DIAGNOSES  Diagnosis: BPH (benign prostatic hyperplasia)  Assessment and Plan of Treatment: BPH (benign prostatic hyperplasia)    Diagnosis: HLD (hyperlipidemia)  Assessment and Plan of Treatment: HLD (hyperlipidemia)    Diagnosis: HTN (hypertension)  Assessment and Plan of Treatment: HTN (hypertension)    Diagnosis: Renal mass, right  Assessment and Plan of Treatment: Renal mass, right    Diagnosis: Hernia  Assessment and Plan of Treatment: Hernia    Diagnosis: GERD (gastroesophageal reflux disease)  Assessment and Plan of Treatment: GERD (gastroesophageal reflux disease)

## 2020-11-14 NOTE — CONSULT NOTE ADULT - PROBLEM SELECTOR RECOMMENDATION 9
The patient has acute hypoxic respiratory failure.  The baseline oxygen saturation is normal but he is tachypneic.  Incentive patient is using incentive spirometry.  The patient is out of bed in chair.  Ultrasound of the chest revealed no pleural effusion both the right and left side.  There is area of consolidation in the left lower lobe.  It most likely related to splinting and atelectasis.  There is no clinical evidence of underlying pneumonia.  Hold on antibiotic.  Follow the patient clinically

## 2020-11-14 NOTE — DISCHARGE NOTE PROVIDER - NSDCMRMEDTOKEN_GEN_ALL_CORE_FT
aspirin 325 mg oral tablet: 1 tab(s) orally once a day  ezetimibe 10 mg oral tablet: 1 tab(s) orally once a day  metoprolol tartrate 25 mg oral tablet: 2 tab(s) orally once a day  NexIUM 20 mg oral delayed release capsule: 1 cap(s) orally once a day  rosuvastatin 20 mg oral tablet: 1 tab(s) orally once a day  tamsulosin 0.4 mg oral capsule: 2 cap(s) orally once a day  telmisartan 80 mg oral tablet: 1 tab(s) orally once a day

## 2020-11-14 NOTE — DISCHARGE NOTE PROVIDER - NSDCFUADDAPPT_GEN_ALL_CORE_FT
Please call Dr. Aguirre's office to make follow up appointment. Please call Dr. Aguirre's office to make follow up appointment on Monday 11/23/2020 to see him.

## 2020-11-14 NOTE — DISCHARGE NOTE PROVIDER - INSTRUCTIONS
IF you have regular BMs and are passing gas you can advance your diet to soft food and then increase as you can tolerate to regular diet

## 2020-11-15 ENCOUNTER — TRANSCRIPTION ENCOUNTER (OUTPATIENT)
Age: 55
End: 2020-11-15

## 2020-11-15 VITALS
HEART RATE: 96 BPM | RESPIRATION RATE: 18 BRPM | OXYGEN SATURATION: 99 % | TEMPERATURE: 99 F | SYSTOLIC BLOOD PRESSURE: 116 MMHG | DIASTOLIC BLOOD PRESSURE: 76 MMHG

## 2020-11-15 DIAGNOSIS — R06.02 SHORTNESS OF BREATH: ICD-10-CM

## 2020-11-15 DIAGNOSIS — N28.89 OTHER SPECIFIED DISORDERS OF KIDNEY AND URETER: ICD-10-CM

## 2020-11-15 PROCEDURE — 86900 BLOOD TYPING SEROLOGIC ABO: CPT

## 2020-11-15 PROCEDURE — C1889: CPT

## 2020-11-15 PROCEDURE — 82962 GLUCOSE BLOOD TEST: CPT

## 2020-11-15 PROCEDURE — 80048 BASIC METABOLIC PNL TOTAL CA: CPT

## 2020-11-15 PROCEDURE — C1781: CPT

## 2020-11-15 PROCEDURE — 86901 BLOOD TYPING SEROLOGIC RH(D): CPT

## 2020-11-15 PROCEDURE — 36415 COLL VENOUS BLD VENIPUNCTURE: CPT

## 2020-11-15 PROCEDURE — 88307 TISSUE EXAM BY PATHOLOGIST: CPT

## 2020-11-15 PROCEDURE — 82330 ASSAY OF CALCIUM: CPT

## 2020-11-15 PROCEDURE — 85027 COMPLETE CBC AUTOMATED: CPT

## 2020-11-15 PROCEDURE — 83735 ASSAY OF MAGNESIUM: CPT

## 2020-11-15 PROCEDURE — 84132 ASSAY OF SERUM POTASSIUM: CPT

## 2020-11-15 PROCEDURE — 82570 ASSAY OF URINE CREATININE: CPT

## 2020-11-15 PROCEDURE — 97161 PT EVAL LOW COMPLEX 20 MIN: CPT

## 2020-11-15 PROCEDURE — 88305 TISSUE EXAM BY PATHOLOGIST: CPT

## 2020-11-15 PROCEDURE — 71045 X-RAY EXAM CHEST 1 VIEW: CPT

## 2020-11-15 PROCEDURE — S2900: CPT

## 2020-11-15 PROCEDURE — 84100 ASSAY OF PHOSPHORUS: CPT

## 2020-11-15 PROCEDURE — 85018 HEMOGLOBIN: CPT

## 2020-11-15 PROCEDURE — 86850 RBC ANTIBODY SCREEN: CPT

## 2020-11-15 PROCEDURE — 84295 ASSAY OF SERUM SODIUM: CPT

## 2020-11-15 RX ADMIN — HEPARIN SODIUM 5000 UNIT(S): 5000 INJECTION INTRAVENOUS; SUBCUTANEOUS at 06:52

## 2020-11-15 RX ADMIN — Medication 15 MILLIGRAM(S): at 06:52

## 2020-11-15 RX ADMIN — Medication 50 MILLIGRAM(S): at 06:52

## 2020-11-15 RX ADMIN — Medication 15 MILLIGRAM(S): at 00:39

## 2020-11-15 RX ADMIN — Medication 15 MILLIGRAM(S): at 07:22

## 2020-11-15 RX ADMIN — PANTOPRAZOLE SODIUM 40 MILLIGRAM(S): 20 TABLET, DELAYED RELEASE ORAL at 07:00

## 2020-11-15 RX ADMIN — Medication 15 MILLIGRAM(S): at 01:09

## 2020-11-15 NOTE — PROGRESS NOTE ADULT - SUBJECTIVE AND OBJECTIVE BOX
INTERVAL HPI/OVERNIGHT EVENTS:  No acute events overnight.    Patient feeling well and without complaints. Walked for 1 hour ~ 10 PM. Was able to urinate and had 1 BM.     VITALS:    T(F): 99.1 (11-14-20 @ 20:10), Max: 99.1 (11-14-20 @ 20:10)  HR: 73 (11-14-20 @ 20:10) (61 - 73)  BP: 148/81 (11-14-20 @ 20:10) (114/71 - 148/81)  RR: 18 (11-14-20 @ 20:10) (18 - 19)  SpO2: 94% (11-14-20 @ 20:10) (91% - 94%)  Wt(kg): --    I&O's Detail    13 Nov 2020 07:01  -  14 Nov 2020 07:00  --------------------------------------------------------  IN:    Oral Fluid: 240 mL    sodium chloride 0.9%: 1400 mL  Total IN: 1640 mL    OUT:    Bulb (mL): 65 mL    Voided (mL): 1625 mL  Total OUT: 1690 mL    Total NET: -50 mL      14 Nov 2020 07:01  -  15 Nov 2020 05:00  --------------------------------------------------------  IN:    Oral Fluid: 500 mL    sodium chloride 0.9%: 500 mL  Total IN: 1000 mL    OUT:    Bulb (mL): 70 mL    Voided (mL): 400 mL  Total OUT: 470 mL    Total NET: 530 mL          MEDICATIONS:    ANTIBIOTICS:      PAIN CONTROL:  acetaminophen   Tablet .. 650 milliGRAM(s) Oral every 6 hours PRN  ketorolac   Injectable 15 milliGRAM(s) IV Push every 6 hours  ondansetron Injectable 4 milliGRAM(s) IV Push once  oxycodone    5 mG/acetaminophen 325 mG 1 Tablet(s) Oral every 4 hours PRN  oxycodone    5 mG/acetaminophen 325 mG 2 Tablet(s) Oral every 6 hours PRN       MEDS:  oxybutynin 5 milliGRAM(s) Oral every 8 hours PRN      HEME/ONC  heparin   Injectable 5000 Unit(s) SubCutaneous every 8 hours        PHYSICAL EXAM:  General: No acute distress.  Alert and Oriented  Pulm: Non-labored, no respiratory distress, on room air  Abdominal Exam: Soft, distended (though patient reports improved from earlier in day), mildly tender to palpation   Exam: WNL      LABS:                        12.4   9.72  )-----------( 162      ( 14 Nov 2020 07:15 )             37.7     11-14    137  |  102  |  25<H>  ----------------------------<  100<H>  3.9   |  23  |  1.30    Ca    8.2<L>      14 Nov 2020 07:15  Phos  1.9     11-14  Mg     2.3     11-14

## 2020-11-15 NOTE — PROGRESS NOTE ADULT - PROBLEM SELECTOR PLAN 5
- s/p ventral hernia repair with mesh  - general surgery following
- s/p ventral hernia repair with mesh  - gen surg following, appreciate recs
- s/p ventral hernia repair with mesh  - general surgery following

## 2020-11-15 NOTE — DISCHARGE NOTE NURSING/CASE MANAGEMENT/SOCIAL WORK - PATIENT PORTAL LINK FT
You can access the FollowMyHealth Patient Portal offered by [facility] by registering at the following website: [email or web address]. By joining [facility]’s FollowMyHealth portal, you will also be able to view your health information using other applications (apps) compatible with our system.

## 2020-11-15 NOTE — PROGRESS NOTE ADULT - PROBLEM SELECTOR PLAN 1
- s/p robotic partial nephrectomy   - pain control   - OOB  - DVT prophylaxis   - monitor urine output   - monitor HAY drain outputs   - diet: clears
- s/p robotic partial nephrectomy   - pain control   - OOB  - DVT prophylaxis   - monitor urine output   - monitor HAY drain outputs   - diet: clears
- s/p robotic partial nephrectomy, now s/p [place] and HAY drain  - monitor urine output  - pain control  - OOB  - diet: clears  - dvt ppx: sqh and scds

## 2020-11-15 NOTE — PROGRESS NOTE ADULT - PROBLEM SELECTOR PLAN 2
- continue home medications: Flomax 0.4 BID
- c/w home meds
- continue home medications: Flomax 0.4 BID

## 2020-11-16 LAB — SURGICAL PATHOLOGY STUDY: SIGNIFICANT CHANGE UP

## 2020-11-17 PROBLEM — N28.89 OTHER SPECIFIED DISORDERS OF KIDNEY AND URETER: Chronic | Status: ACTIVE | Noted: 2020-11-11

## 2020-11-17 PROBLEM — E78.5 HYPERLIPIDEMIA, UNSPECIFIED: Chronic | Status: ACTIVE | Noted: 2020-11-12

## 2020-11-17 PROBLEM — N40.0 BENIGN PROSTATIC HYPERPLASIA WITHOUT LOWER URINARY TRACT SYMPTOMS: Chronic | Status: ACTIVE | Noted: 2020-11-12

## 2020-11-17 PROBLEM — K21.9 GASTRO-ESOPHAGEAL REFLUX DISEASE WITHOUT ESOPHAGITIS: Chronic | Status: ACTIVE | Noted: 2020-11-11

## 2020-11-17 PROBLEM — K46.9 UNSPECIFIED ABDOMINAL HERNIA WITHOUT OBSTRUCTION OR GANGRENE: Chronic | Status: ACTIVE | Noted: 2020-11-11

## 2020-11-17 PROBLEM — I25.10 ATHEROSCLEROTIC HEART DISEASE OF NATIVE CORONARY ARTERY WITHOUT ANGINA PECTORIS: Chronic | Status: ACTIVE | Noted: 2020-11-11

## 2020-11-17 PROBLEM — I10 ESSENTIAL (PRIMARY) HYPERTENSION: Chronic | Status: ACTIVE | Noted: 2020-11-12

## 2020-11-23 ENCOUNTER — APPOINTMENT (OUTPATIENT)
Dept: UROLOGY | Facility: CLINIC | Age: 55
End: 2020-11-23
Payer: COMMERCIAL

## 2020-11-23 VITALS — SYSTOLIC BLOOD PRESSURE: 127 MMHG | TEMPERATURE: 96.8 F | HEART RATE: 73 BPM | DIASTOLIC BLOOD PRESSURE: 75 MMHG

## 2020-11-23 PROCEDURE — 99024 POSTOP FOLLOW-UP VISIT: CPT

## 2020-11-23 NOTE — ASSESSMENT
[FreeTextEntry1] : 54yo male with 2.3cm right lower pole renal mass\par s/p right robotic partial nephrectomy\par recovering appropriately\par path reviewed, pT1a, ccRCC, grade 2, negative margins\par f/u 3 months

## 2020-11-23 NOTE — HISTORY OF PRESENT ILLNESS
[FreeTextEntry1] : 54yo male with history of CAD s/p CABG a few years ago, referred by Dr. Mason for 2.3cm right renal mass. Mass was previously 1.7cm 1 year ago. Asymptomatic. No history of smoking. \par \par 11/23/20 Here for postop visit. Underwent right robotic partial nephrectomy, umbilical hernia repair. Doing OK, healing appropriately. Path: pT1a, ccRCC, grade 2, negative margins. [None] : None

## 2020-11-27 DIAGNOSIS — I10 ESSENTIAL (PRIMARY) HYPERTENSION: ICD-10-CM

## 2020-11-27 DIAGNOSIS — C64.1 MALIGNANT NEOPLASM OF RIGHT KIDNEY, EXCEPT RENAL PELVIS: ICD-10-CM

## 2020-11-27 DIAGNOSIS — E78.5 HYPERLIPIDEMIA, UNSPECIFIED: ICD-10-CM

## 2020-11-27 DIAGNOSIS — N28.89 OTHER SPECIFIED DISORDERS OF KIDNEY AND URETER: ICD-10-CM

## 2020-11-27 DIAGNOSIS — Z95.1 PRESENCE OF AORTOCORONARY BYPASS GRAFT: ICD-10-CM

## 2020-11-27 DIAGNOSIS — Z98.1 ARTHRODESIS STATUS: ICD-10-CM

## 2020-11-27 DIAGNOSIS — N40.0 BENIGN PROSTATIC HYPERPLASIA WITHOUT LOWER URINARY TRACT SYMPTOMS: ICD-10-CM
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
## 2020-11-27 DIAGNOSIS — K43.9 VENTRAL HERNIA WITHOUT OBSTRUCTION OR GANGRENE: ICD-10-CM

## 2020-11-27 DIAGNOSIS — J95.89 OTHER POSTPROCEDURAL COMPLICATIONS AND DISORDERS OF RESPIRATORY SYSTEM, NOT ELSEWHERE CLASSIFIED: ICD-10-CM

## 2020-11-27 DIAGNOSIS — J98.11 ATELECTASIS: ICD-10-CM

## 2020-11-27 DIAGNOSIS — I25.10 ATHEROSCLEROTIC HEART DISEASE OF NATIVE CORONARY ARTERY WITHOUT ANGINA PECTORIS: ICD-10-CM
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
## 2020-11-27 DIAGNOSIS — I25.2 OLD MYOCARDIAL INFARCTION: ICD-10-CM

## 2020-11-27 DIAGNOSIS — K21.9 GASTRO-ESOPHAGEAL REFLUX DISEASE WITHOUT ESOPHAGITIS: ICD-10-CM

## 2021-01-08 ENCOUNTER — APPOINTMENT (OUTPATIENT)
Dept: UROLOGY | Facility: CLINIC | Age: 56
End: 2021-01-08

## 2021-01-22 ENCOUNTER — APPOINTMENT (OUTPATIENT)
Dept: UROLOGY | Facility: CLINIC | Age: 56
End: 2021-01-22
Payer: COMMERCIAL

## 2021-01-22 VITALS — TEMPERATURE: 97.2 F | DIASTOLIC BLOOD PRESSURE: 76 MMHG | SYSTOLIC BLOOD PRESSURE: 121 MMHG

## 2021-01-22 PROCEDURE — 51741 ELECTRO-UROFLOWMETRY FIRST: CPT

## 2021-01-22 PROCEDURE — 51798 US URINE CAPACITY MEASURE: CPT | Mod: 59

## 2021-01-22 PROCEDURE — 99214 OFFICE O/P EST MOD 30 MIN: CPT | Mod: 25

## 2021-01-22 PROCEDURE — 99072 ADDL SUPL MATRL&STAF TM PHE: CPT

## 2021-01-22 NOTE — PHYSICAL EXAM
[Bowel Sounds] : normal bowel sounds [Abdomen Soft] : soft [Abdomen Tenderness] : non-tender [] : no hepato-splenomegaly [Abdomen Mass (___ Cm)] : no abdominal mass palpated [Urethral Meatus] : meatus normal [Penis Abnormality] : normal circumcised penis [Epididymis] : the epididymides were normal [Testes Tenderness] : no tenderness of the testes [Testes Mass (___cm)] : there were no testicular masses [Anus Abnormality] : the anus and perineum were normal [Prostate Tenderness] : was tender [FreeTextEntry1] : PVR 0

## 2021-01-22 NOTE — HISTORY OF PRESENT ILLNESS
[FreeTextEntry1] : 51 year old male with history of stone (left side 9 mm) one year ago. Had elevated PSA which he did not followup.\par Had been on Flomax 2 tabs for urinary symptoms and stone disease.\par No followup for 1 year.\par \par \par 5.18.2017\par no further flank pain\par did not pass any further stone\par no pain; no longer straining urine. \par urinary symptoms improved on Flomax 2 tabs daily\par improved on 2 tablets; patient does not believe his symptoms warrant intervention.\par \par 11.16.2017\par has not passed stone / passage of small stone\par urinary symptoms continue to be much improved\par no further episodes of pain or hematuria\par \par 5.7.2018\par no further flank pain\par urinating with good stream\par nocturia x 1\par stopped drinking MATE caffeinated product \par \par 11.9.2018\par patient did not get renal usg re ? ureteral stone passage\par Patient thinks he passed ? small stone\par was supposed to get USG but did not because of excision of back cyst\par taking Tamsulosin\par when stopped procedure symtoms get worse and unhappy\par \par 5.10.2019\par 6 days ago developed \par developed urinary symptoms\par dysuria and frequency\par called relative treated nitrufurantoin\par mild improvement\par also complaining of constipation\par after laxative symtoms \par continues tamsulosin 2 tabs daily \par \par 5.31.2019\par returns after treatment of prostatitis\par \par \par 9.4.2020\par continues with LUTS\par on tamsulosin 2 tabs daily\par continues to drink coffee and cola\par he had been drinking 20 cups per day and now 2; aware of impact on symptoms\par complaining of decreased libido\par marital discord; life stresses\par \par \par 9.14.2020\par referred to Dr Aguirre re renal mass\par \par 1.22.2021 s/p partial nephrectomy\par clear cell renal cell grade 2\par returns complaining of loss of libido and energy\par discussed need for followup\par discussed repeat CT in one year\par \par

## 2021-01-22 NOTE — ASSESSMENT
[FreeTextEntry1] : Patient now asymptomatic after acute prostatits\par Has not had CT - approval obtained w/o contrast\par Needs followup re ureteral stone\par Complaining of low libido - will assess T and prolactin\par \par 9.4.2020 \par patient did not followup on CT\par again emphasized need for CT to followup renal mass\par continue Flomax 2 tabs\par patient interested in urololift when completes surgery for Basal cell\par \par complaining of loss of libido\par will recheck T\par discussed PSA \par \par referral to [facility] re symptomatic umbilical hernia\par \par \par 1.22.2021\par discussed libido ; multiple situational social issues; business issues; wife not interested in sexual intercourse \par prior T levels low when previously measured\par libido and energy levels worse\par complaining of loss of energy and libido\par exertional sob\par no chest pain\par emphasized  need for cardiology evaluation\par discussed 'counseling" re life stresses\par \par We had an extensive discussion re Risks of T replacement; Patient was informed about Black box warning from FDA.\par We discussed recent data regarding increased risk of coronary plaque size, heart disease; thrombolic event; increased Hbg/Hct, breast tenderness; acne; irritability; sleep apnea and increased urinary symptoms; effect on testicular function including suppression of spermatogenesis; hair loss (male pattern baldness) effect on LFTS; effect on prostate cancer.\par Different treatment approaches were discussed including IM, transdermal and Testopel\par We discussed advantages and disadvantages of each\par We discussed risk to T exposure to partners and children from transdermal appoaches.\par \par Patient needs to see cardiologist re SALEEM and lack of energy\par complaining of nocturia (drinking tea before sleep)\par continues on flomax\par discussed cessation tea and Pandya\par \par reviewed RCC and need for followup after partial nephrectomy\par

## 2021-01-25 LAB
APPEARANCE: ABNORMAL
BACTERIA: NEGATIVE
BILIRUBIN URINE: NEGATIVE
BLOOD URINE: NORMAL
COLOR: ABNORMAL
GLUCOSE QUALITATIVE U: NEGATIVE
HYALINE CASTS: 0 /LPF
KETONES URINE: NEGATIVE
LEUKOCYTE ESTERASE URINE: NEGATIVE
MICROSCOPIC-UA: NORMAL
NITRITE URINE: NEGATIVE
PH URINE: 5.5
PROTEIN URINE: NORMAL
RED BLOOD CELLS URINE: 0 /HPF
SPECIFIC GRAVITY URINE: 1.03
SQUAMOUS EPITHELIAL CELLS: 1 /HPF
URINE COMMENTS: NORMAL
UROBILINOGEN URINE: NORMAL
WHITE BLOOD CELLS URINE: 1 /HPF

## 2021-02-10 ENCOUNTER — APPOINTMENT (OUTPATIENT)
Dept: UROLOGY | Facility: CLINIC | Age: 56
End: 2021-02-10

## 2021-06-23 NOTE — REASON FOR VISIT
[Follow-Up - Clinic] : a clinic follow-up of [Hyperlipidemia] : hyperlipidemia [Hypertension] : hypertension [Prior Myocardial Infarction] : a prior myocardial infarction [FreeTextEntry1] : Murmur

## 2021-06-23 NOTE — DISCUSSION/SUMMARY
[FreeTextEntry1] : This is a 55-year-old male with past medical history significant for coronary artery disease status post myocardial infarction, status post coronary artery bypass surgery in February 2016 at [facility], hyperlipidemia, hypertension, comes in for preoperative cardiac evaluation.\par The patient is scheduled for laparoscopic removal of a right kidney lesion and ventral hernia repair November 12, 2020.\par The patient denies chest pain, shortness of breath, dizziness or syncope.  The patient will schedule exercise stress test.\par The patient had recent blood work done October 15, 2020 which demonstrated a direct LDL of 141 mg/dL, triglycerides of 223, HDL 41, and direct LDL of 70 mg/dL.\par Given this patient's cardiovascular risk, I would recommend adding Zetia 10 mg/day to his current dose of Crestor 20 mg/day to further reduce his LDL cholesterol.  He will have new blood work in 6 weeks.\par \par He had a normal exercise stress test November 6, 2020.\par The patient had an echo Doppler examination done which demonstrated ejection fraction of approximately 55%.\par Electrocardiogram done March 12, 2020 demonstrated normal sinus rhythm at a rate of 78 bpm is otherwise remarkable for left axis deviation, and left atrial abnormality.\par \par This patient is cleared from a cardiac standpoint for surgery pending acceptable preoperative laboratory values.  The patient should be allowed to take his usual medications in the perioperative period.  Please avoid overhydration.  Maintain prophylaxis for deep venous thrombosis.  The patient should have a incentive spirometer in the perioperative period.\par  \par The patient understands that aerobic exercises must be increased to 40 minutes 4 times per week. A detailed discussion of lifestyle modification was done today. The patient has a good understanding of the diagnosis, and treatment plan. Lifestyle modification was also outlined.

## 2021-06-23 NOTE — PHYSICAL EXAM
[Well Developed] : well developed [Well Nourished] : well nourished [No Acute Distress] : no acute distress [Normal Venous Pressure] : normal venous pressure [No Carotid Bruit] : no carotid bruit [Normal S1, S2] : normal S1, S2 [No Murmur] : no murmur [No Rub] : no rub [No Gallop] : no gallop [Clear Lung Fields] : clear lung fields [Good Air Entry] : good air entry [No Respiratory Distress] : no respiratory distress  [Soft] : abdomen soft [Non Tender] : non-tender [No Masses/organomegaly] : no masses/organomegaly [Normal Bowel Sounds] : normal bowel sounds [Normal Gait] : normal gait [No Edema] : no edema [No Cyanosis] : no cyanosis [No Clubbing] : no clubbing [No Varicosities] : no varicosities [No Rash] : no rash [No Skin Lesions] : no skin lesions [No Focal Deficits] : no focal deficits [Moves all extremities] : moves all extremities [Normal Speech] : normal speech [Alert and Oriented] : alert and oriented [Normal memory] : normal memory [General Appearance - Well Developed] : well developed [Normal Appearance] : normal appearance [Well Groomed] : well groomed [General Appearance - Well Nourished] : well nourished [No Deformities] : no deformities [General Appearance - In No Acute Distress] : no acute distress [Normal Conjunctiva] : the conjunctiva exhibited no abnormalities [Normal Oral Mucosa] : normal oral mucosa [Normal Jugular Venous A Waves Present] : normal jugular venous A waves present [Normal Jugular Venous V Waves Present] : normal jugular venous V waves present [No Jugular Venous Chacko A Waves] : no jugular venous chacko A waves [Respiration, Rhythm And Depth] : normal respiratory rhythm and effort [Exaggerated Use Of Accessory Muscles For Inspiration] : no accessory muscle use [Auscultation Breath Sounds / Voice Sounds] : lungs were clear to auscultation bilaterally [Abdomen Soft] : soft [Abnormal Walk] : normal gait [Nail Clubbing] : no clubbing of the fingernails [Cyanosis, Localized] : no localized cyanosis [Petechial Hemorrhages (___cm)] : no petechial hemorrhages [Skin Color & Pigmentation] : normal skin color and pigmentation [] : no rash [No Venous Stasis] : no venous stasis [Skin Lesions] : no skin lesions [No Skin Ulcers] : no skin ulcer [No Xanthoma] : no  xanthoma was observed [Oriented To Time, Place, And Person] : oriented to person, place, and time [Affect] : the affect was normal [Mood] : the mood was normal [No Anxiety] : not feeling anxious [5th Left ICS - MCL] : palpated at the 5th LICS in the midclavicular line [Normal] : normal [No Precordial Heave] : no precordial heave was noted [Normal Rate] : normal [Rhythm Regular] : regular [Normal S1] : normal S1 [Normal S2] : normal S2 [Click] : no click [Pericardial Rub] : no pericardial rub [I] : a grade 1 [2+] : left 2+ [No Abnormalities] : the abdominal aorta was not enlarged and no bruit was heard [No Pitting Edema] : no pitting edema present

## 2021-06-24 ENCOUNTER — APPOINTMENT (OUTPATIENT)
Dept: CARDIOLOGY | Facility: CLINIC | Age: 56
End: 2021-06-24

## 2021-07-21 PROBLEM — I20.9 ANGINA PECTORIS: Status: ACTIVE | Noted: 2018-01-11

## 2021-07-22 ENCOUNTER — APPOINTMENT (OUTPATIENT)
Dept: CARDIOLOGY | Facility: CLINIC | Age: 56
End: 2021-07-22
Payer: COMMERCIAL

## 2021-07-22 VITALS
OXYGEN SATURATION: 98 % | BODY MASS INDEX: 33.04 KG/M2 | HEART RATE: 75 BPM | DIASTOLIC BLOOD PRESSURE: 79 MMHG | RESPIRATION RATE: 15 BRPM | WEIGHT: 236 LBS | SYSTOLIC BLOOD PRESSURE: 126 MMHG | TEMPERATURE: 98 F | HEIGHT: 71 IN

## 2021-07-22 DIAGNOSIS — I20.9 ANGINA PECTORIS, UNSPECIFIED: ICD-10-CM

## 2021-07-22 PROCEDURE — 93000 ELECTROCARDIOGRAM COMPLETE: CPT

## 2021-07-22 PROCEDURE — 99214 OFFICE O/P EST MOD 30 MIN: CPT

## 2021-07-22 PROCEDURE — 99072 ADDL SUPL MATRL&STAF TM PHE: CPT

## 2021-07-22 NOTE — REASON FOR VISIT
[Arrhythmia/ECG Abnorrmalities] : arrhythmia/ECG abnormalities [Follow-Up - Clinic] : a clinic follow-up of [Hyperlipidemia] : hyperlipidemia [Hypertension] : hypertension [Prior Myocardial Infarction] : a prior myocardial infarction [CV Risk Factors and Non-Cardiac Disease] : CV risk factors and non-cardiac disease [Coronary Artery Disease] : coronary artery disease [Other: ____] : [unfilled] [FreeTextEntry3] : DR FABIAN MOE [FreeTextEntry1] : Murmur

## 2021-07-22 NOTE — PHYSICAL EXAM
[Well Developed] : well developed [Well Nourished] : well nourished [No Acute Distress] : no acute distress [Normal Venous Pressure] : normal venous pressure [No Carotid Bruit] : no carotid bruit [Normal S1, S2] : normal S1, S2 [No Murmur] : no murmur [No Rub] : no rub [No Gallop] : no gallop [Clear Lung Fields] : clear lung fields [Good Air Entry] : good air entry [No Respiratory Distress] : no respiratory distress  [Soft] : abdomen soft [Non Tender] : non-tender [No Masses/organomegaly] : no masses/organomegaly [Normal Bowel Sounds] : normal bowel sounds [Normal Gait] : normal gait [No Edema] : no edema [No Cyanosis] : no cyanosis [No Clubbing] : no clubbing [No Varicosities] : no varicosities [No Rash] : no rash [No Skin Lesions] : no skin lesions [Moves all extremities] : moves all extremities [No Focal Deficits] : no focal deficits [Normal Speech] : normal speech [Alert and Oriented] : alert and oriented [Normal memory] : normal memory [General Appearance - Well Developed] : well developed [Normal Appearance] : normal appearance [Well Groomed] : well groomed [General Appearance - Well Nourished] : well nourished [No Deformities] : no deformities [General Appearance - In No Acute Distress] : no acute distress [Normal Conjunctiva] : the conjunctiva exhibited no abnormalities [Normal Oral Mucosa] : normal oral mucosa [Normal Jugular Venous A Waves Present] : normal jugular venous A waves present [Normal Jugular Venous V Waves Present] : normal jugular venous V waves present [No Jugular Venous Chacko A Waves] : no jugular venous chacko A waves [Respiration, Rhythm And Depth] : normal respiratory rhythm and effort [Exaggerated Use Of Accessory Muscles For Inspiration] : no accessory muscle use [Auscultation Breath Sounds / Voice Sounds] : lungs were clear to auscultation bilaterally [Abdomen Soft] : soft [Abnormal Walk] : normal gait [Nail Clubbing] : no clubbing of the fingernails [Cyanosis, Localized] : no localized cyanosis [Petechial Hemorrhages (___cm)] : no petechial hemorrhages [Skin Color & Pigmentation] : normal skin color and pigmentation [] : no rash [No Venous Stasis] : no venous stasis [Skin Lesions] : no skin lesions [No Skin Ulcers] : no skin ulcer [No Xanthoma] : no  xanthoma was observed [Oriented To Time, Place, And Person] : oriented to person, place, and time [Affect] : the affect was normal [Mood] : the mood was normal [No Anxiety] : not feeling anxious [5th Left ICS - MCL] : palpated at the 5th LICS in the midclavicular line [Normal] : normal [No Precordial Heave] : no precordial heave was noted [Normal Rate] : normal [Rhythm Regular] : regular [Normal S1] : normal S1 [Normal S2] : normal S2 [I] : a grade 1 [2+] : left 2+ [No Abnormalities] : the abdominal aorta was not enlarged and no bruit was heard [No Pitting Edema] : no pitting edema present [Click] : no click [Pericardial Rub] : no pericardial rub

## 2021-07-22 NOTE — DISCUSSION/SUMMARY
[FreeTextEntry1] : This is a 55-year-old male with past medical history significant for coronary artery disease status post myocardial infarction, status post coronary artery bypass surgery in February 2016 at [facility], hyperlipidemia, hypertension, comes in for preoperative cardiac evaluation.\par Electrocardiogram done July 22, 2021 demonstrated normal sinus rhythm rate 66 beats per minute and is otherwise remarkable for left axis deviation and nonspecific ST-T wave changes.\par Patient will go for new blood work later today for lipid panel SMA-20 and hemoglobin A1c.\par Echo Doppler examination done November 6, 2020 demonstrated minimal to mild mitral valve regurgitation, mild tricuspid valve regurgitation, mild pulmonic valve regurgitation, aortic valve thickening and sclerosis with mild to moderate aortic valve regurgitation and satisfactory left ventricular function with estimated ejection fraction of 50-55%.\par He is currently hemodynamically stable and asymptomatic from cardiac standpoint.\par \par The patient had laparoscopic removal of a right kidney lesion and ventral hernia repair November 12, 2020.\par He had a normal exercise stress test November 6, 2020.\par The patient had an echo Doppler examination done which demonstrated ejection fraction of approximately 55%.\par Electrocardiogram done March 12, 2020 demonstrated normal sinus rhythm at a rate of 78 bpm is otherwise remarkable for left axis deviation, and left atrial abnormality.\par \par The patient understands that aerobic exercises must be increased to 40 minutes 4 times per week. A detailed discussion of lifestyle modification was done today. The patient has a good understanding of the diagnosis, and treatment plan. Lifestyle modification was also outlined.

## 2021-08-05 ENCOUNTER — NON-APPOINTMENT (OUTPATIENT)
Age: 56
End: 2021-08-05

## 2022-01-03 ENCOUNTER — RX RENEWAL (OUTPATIENT)
Age: 57
End: 2022-01-03

## 2022-01-27 ENCOUNTER — APPOINTMENT (OUTPATIENT)
Dept: CARDIOLOGY | Facility: CLINIC | Age: 57
End: 2022-01-27
Payer: COMMERCIAL

## 2022-01-27 VITALS
TEMPERATURE: 98 F | SYSTOLIC BLOOD PRESSURE: 130 MMHG | BODY MASS INDEX: 31.36 KG/M2 | HEIGHT: 71 IN | OXYGEN SATURATION: 98 % | HEART RATE: 69 BPM | WEIGHT: 224 LBS | DIASTOLIC BLOOD PRESSURE: 84 MMHG

## 2022-01-27 PROCEDURE — 99214 OFFICE O/P EST MOD 30 MIN: CPT

## 2022-01-27 PROCEDURE — 93000 ELECTROCARDIOGRAM COMPLETE: CPT

## 2022-01-27 NOTE — REASON FOR VISIT
[CV Risk Factors and Non-Cardiac Disease] : CV risk factors and non-cardiac disease [Arrhythmia/ECG Abnorrmalities] : arrhythmia/ECG abnormalities [Coronary Artery Disease] : coronary artery disease [Other: ____] : [unfilled] [Follow-Up - Clinic] : a clinic follow-up of [Hyperlipidemia] : hyperlipidemia [Hypertension] : hypertension [Prior Myocardial Infarction] : a prior myocardial infarction [FreeTextEntry1] : Murmur

## 2022-01-27 NOTE — DISCUSSION/SUMMARY
[FreeTextEntry1] : This is a 56-year-old male with past medical history significant for coronary artery disease status post myocardial infarction, status post coronary artery bypass surgery in February 2016 at [facility], hyperlipidemia, hypertension, comes in for followup cardiac evaluation.\par He denies chest pain, shortness of breath, dizziness or syncope.  His primary complaint today is fatigue.\par Electrocardiogram done January 27, 2022 demonstrated normal sinus rhythm rate 60 beats per minute is otherwise remarkable for left axis deviation and left atrial abnormality.\par The patient has been stable on his current medications.  He will fasting lipid panel done in the next month.\par He will followup with his primary care physician regarding his fatigue.  He will remain on his usual medications.\par He will schedule echo Doppler examination to evaluate left function, chamber size, murmur, rule out hypertrophy.\par \par Echo Doppler examination done November 6, 2020 demonstrated minimal to mild mitral valve regurgitation, mild tricuspid valve regurgitation, mild pulmonic valve regurgitation, aortic valve thickening and sclerosis with mild to moderate aortic valve regurgitation and satisfactory left ventricular function with estimated ejection fraction of 50-55%.\par He is currently hemodynamically stable and asymptomatic from cardiac standpoint.\par \par The patient had laparoscopic removal of a right kidney lesion and ventral hernia repair November 12, 2020.\par He had a normal exercise stress test November 6, 2020.\par The patient had an echo Doppler examination done which demonstrated ejection fraction of approximately 55%.\par Electrocardiogram done March 12, 2020 demonstrated normal sinus rhythm at a rate of 78 bpm is otherwise remarkable for left axis deviation, and left atrial abnormality.\par \par The patient understands that aerobic exercises must be increased to 40 minutes 4 times per week. A detailed discussion of lifestyle modification was done today. The patient has a good understanding of the diagnosis, and treatment plan. Lifestyle modification was also outlined.

## 2022-02-12 ENCOUNTER — RX RENEWAL (OUTPATIENT)
Age: 57
End: 2022-02-12

## 2022-05-05 ENCOUNTER — RX RENEWAL (OUTPATIENT)
Age: 57
End: 2022-05-05

## 2022-05-10 ENCOUNTER — RX RENEWAL (OUTPATIENT)
Age: 57
End: 2022-05-10

## 2022-07-18 ENCOUNTER — NON-APPOINTMENT (OUTPATIENT)
Age: 57
End: 2022-07-18

## 2022-07-25 ENCOUNTER — NON-APPOINTMENT (OUTPATIENT)
Age: 57
End: 2022-07-25

## 2022-08-04 ENCOUNTER — RX RENEWAL (OUTPATIENT)
Age: 57
End: 2022-08-04

## 2022-08-04 ENCOUNTER — APPOINTMENT (OUTPATIENT)
Dept: CARDIOLOGY | Facility: CLINIC | Age: 57
End: 2022-08-04

## 2022-08-08 ENCOUNTER — RX RENEWAL (OUTPATIENT)
Age: 57
End: 2022-08-08

## 2022-09-22 ENCOUNTER — APPOINTMENT (OUTPATIENT)
Dept: CARDIOLOGY | Facility: CLINIC | Age: 57
End: 2022-09-22

## 2022-09-22 VITALS
HEART RATE: 65 BPM | SYSTOLIC BLOOD PRESSURE: 127 MMHG | OXYGEN SATURATION: 98 % | DIASTOLIC BLOOD PRESSURE: 82 MMHG | WEIGHT: 240 LBS | BODY MASS INDEX: 34.36 KG/M2 | TEMPERATURE: 97.4 F | HEIGHT: 70 IN

## 2022-09-22 DIAGNOSIS — R42 DIZZINESS AND GIDDINESS: ICD-10-CM

## 2022-09-22 PROCEDURE — 93000 ELECTROCARDIOGRAM COMPLETE: CPT

## 2022-09-22 PROCEDURE — 99214 OFFICE O/P EST MOD 30 MIN: CPT | Mod: 25

## 2022-09-22 NOTE — PHYSICAL EXAM
[Well Developed] : well developed [Well Nourished] : well nourished [No Acute Distress] : no acute distress [Normal Venous Pressure] : normal venous pressure [No Carotid Bruit] : no carotid bruit [Normal S1, S2] : normal S1, S2 [No Rub] : no rub [No Gallop] : no gallop heard [Clear Lung Fields] : clear lung fields [Good Air Entry] : good air entry [No Respiratory Distress] : no respiratory distress  [Soft] : abdomen soft [Non Tender] : non-tender [No Masses/organomegaly] : no masses/organomegaly [Normal Bowel Sounds] : normal bowel sounds [Normal Gait] : normal gait [No Edema] : no edema [No Cyanosis] : no cyanosis [No Clubbing] : no clubbing [No Varicosities] : no varicosities [No Rash] : no rash [No Skin Lesions] : no skin lesions [Moves all extremities] : moves all extremities [No Focal Deficits] : no focal deficits [Normal Speech] : normal speech [Alert and Oriented] : alert and oriented [Normal memory] : normal memory [General Appearance - Well Developed] : well developed [Normal Appearance] : normal appearance [Well Groomed] : well groomed [General Appearance - Well Nourished] : well nourished [No Deformities] : no deformities [General Appearance - In No Acute Distress] : no acute distress [Normal Conjunctiva] : the conjunctiva exhibited no abnormalities [Normal Oral Mucosa] : normal oral mucosa [Normal Jugular Venous A Waves Present] : normal jugular venous A waves present [Normal Jugular Venous V Waves Present] : normal jugular venous V waves present [No Jugular Venous Chacko A Waves] : no jugular venous chacko A waves [Respiration, Rhythm And Depth] : normal respiratory rhythm and effort [Exaggerated Use Of Accessory Muscles For Inspiration] : no accessory muscle use [Auscultation Breath Sounds / Voice Sounds] : lungs were clear to auscultation bilaterally [Abdomen Soft] : soft [Abnormal Walk] : normal gait [Nail Clubbing] : no clubbing of the fingernails [Cyanosis, Localized] : no localized cyanosis [Petechial Hemorrhages (___cm)] : no petechial hemorrhages [Skin Color & Pigmentation] : normal skin color and pigmentation [] : no rash [No Venous Stasis] : no venous stasis [Skin Lesions] : no skin lesions [No Skin Ulcers] : no skin ulcer [No Xanthoma] : no  xanthoma was observed [Oriented To Time, Place, And Person] : oriented to person, place, and time [Affect] : the affect was normal [Mood] : the mood was normal [No Anxiety] : not feeling anxious [5th Left ICS - MCL] : palpated at the 5th LICS in the midclavicular line [Normal] : normal [No Precordial Heave] : no precordial heave was noted [Normal Rate] : normal [Rhythm Regular] : regular [Normal S1] : normal S1 [Normal S2] : normal S2 [I] : a grade 1 [2+] : left 2+ [No Abnormalities] : the abdominal aorta was not enlarged and no bruit was heard [No Pitting Edema] : no pitting edema present [S3] : no S3 [S4] : no S4 [Right Carotid Bruit] : no bruit heard over the right carotid [Left Carotid Bruit] : no bruit heard over the left carotid [Right Femoral Bruit] : no bruit heard over the right femoral artery [Left Femoral Bruit] : no bruit heard over the left femoral artery [Click] : no click [Pericardial Rub] : no pericardial rub

## 2022-09-22 NOTE — DISCUSSION/SUMMARY
[FreeTextEntry1] : This is a 57-year-old male with past medical history significant for coronary artery disease status post myocardial infarction, status post coronary artery bypass surgery in February 2016 at [facility], hyperlipidemia, hypertension, comes in for followup cardiac evaluation.\par He denies chest pain, shortness of breath, dizziness or syncope.  \par The patient had an episode of dizziness and vertigo (the room was spinning) approximately 10 days ago.  At that time he checked his blood pressure once and it was 190/110.  He checked it later in the day and it was better and it was normal the next day.  Is unclear what contributed to this isolated reading.\par His blood pressure is elevated today at 142/90.  He reports that he is taking his medications on a regular basis.\par He does not wish to start on diuretic therapy as he has frequent urination.\par I have asked him to start amlodipine 5 mg in the morning, and he will schedule 24-hour blood pressure monitor.\par I have also asked him to schedule sleep study rule out sleep apnea.\par \par Electrocardiogram done January 27, 2022 demonstrated normal sinus rhythm rate 60 beats per minute is otherwise remarkable for left axis deviation and left atrial abnormality.\par Blood work done January 28, 2022 demonstrated lipoprotein B of 49, direct LDL of 47, hemoglobin A1c of 6.0,\par \par Echo Doppler examination done November 6, 2020 demonstrated minimal to mild mitral valve regurgitation, mild tricuspid valve regurgitation, mild pulmonic valve regurgitation, aortic valve thickening and sclerosis with mild to moderate aortic valve regurgitation and satisfactory left ventricular function with estimated ejection fraction of 50-55%.\par He is currently hemodynamically stable and asymptomatic from cardiac standpoint.\par The patient had laparoscopic removal of a right kidney lesion and ventral hernia repair November 12, 2020.\par He had a normal exercise stress test November 6, 2020.\par Electrocardiogram done March 12, 2020 demonstrated normal sinus rhythm at a rate of 78 bpm is otherwise remarkable for left axis deviation, and left atrial abnormality.\par \par The patient understands that aerobic exercises must be increased to 40 minutes 4 times per week. A detailed discussion of lifestyle modification was done today. The patient has a good understanding of the diagnosis, and treatment plan. Lifestyle modification was also outlined.

## 2022-10-26 ENCOUNTER — RX RENEWAL (OUTPATIENT)
Age: 57
End: 2022-10-26

## 2022-10-31 ENCOUNTER — LABORATORY RESULT (OUTPATIENT)
Age: 57
End: 2022-10-31

## 2022-10-31 ENCOUNTER — APPOINTMENT (OUTPATIENT)
Dept: UROLOGY | Facility: CLINIC | Age: 57
End: 2022-10-31

## 2022-10-31 VITALS
SYSTOLIC BLOOD PRESSURE: 118 MMHG | TEMPERATURE: 97.4 F | DIASTOLIC BLOOD PRESSURE: 79 MMHG | OXYGEN SATURATION: 98 % | HEART RATE: 71 BPM

## 2022-10-31 DIAGNOSIS — R35.1 NOCTURIA: ICD-10-CM

## 2022-10-31 DIAGNOSIS — Z12.5 ENCOUNTER FOR SCREENING FOR MALIGNANT NEOPLASM OF PROSTATE: ICD-10-CM

## 2022-10-31 PROCEDURE — 51798 US URINE CAPACITY MEASURE: CPT

## 2022-10-31 PROCEDURE — 99214 OFFICE O/P EST MOD 30 MIN: CPT | Mod: 25

## 2022-10-31 NOTE — PHYSICAL EXAM
[Bowel Sounds] : normal bowel sounds [Abdomen Soft] : soft [Abdomen Tenderness] : non-tender [] : no hepato-splenomegaly [Abdomen Mass (___ Cm)] : no abdominal mass palpated [Urethral Meatus] : meatus normal [Penis Abnormality] : normal uncircumcised penis [Epididymis] : the epididymides were normal [Testes Tenderness] : no tenderness of the testes [Testes Mass (___cm)] : there were no testicular masses [Anus Abnormality] : the anus and perineum were normal [Prostate Enlargement] : the prostate was not enlarged [Prostate Tenderness] : the prostate was not tender [No Prostate Nodules] : no prostate nodules [Prostate Tenderness] : was tender [FreeTextEntry1] : PVR 24

## 2022-10-31 NOTE — HISTORY OF PRESENT ILLNESS
[Currently Experiencing ___] :  [unfilled] [Urinary Frequency] : urinary frequency [Nocturia] : nocturia [Intermittency] : intermittency [Erectile Dysfunction] : Erectile Dysfunction [FreeTextEntry1] : 51 year old male with history of stone (left side 9 mm) one year ago. Had elevated PSA which he did not followup.\par Had been on Flomax 2 tabs for urinary symptoms and stone disease.\par No followup for 1 year.\par \par \par 5.18.2017\par no further flank pain\par did not pass any further stone\par no pain; no longer straining urine. \par urinary symptoms improved on Flomax 2 tabs daily\par improved on 2 tablets; patient does not believe his symptoms warrant intervention.\par \par 11.16.2017\par has not passed stone / passage of small stone\par urinary symptoms continue to be much improved\par no further episodes of pain or hematuria\par \par 5.7.2018\par no further flank pain\par urinating with good stream\par nocturia x 1\par stopped drinking MATE caffeinated product \par \par 11.9.2018\par patient did not get renal usg re ? ureteral stone passage\par Patient thinks he passed ? small stone\par was supposed to get USG but did not because of excision of back cyst\par taking Tamsulosin\par when stopped procedure symtoms get worse and unhappy\par \par 5.10.2019\par 6 days ago developed \par developed urinary symptoms\par dysuria and frequency\par called relative treated nitrufurantoin\par mild improvement\par also complaining of constipation\par after laxative symtoms \par continues tamsulosin 2 tabs daily \par \par 5.31.2019\par returns after treatment of prostatitis\par \par \par 9.4.2020\par continues with LUTS\par on tamsulosin 2 tabs daily\par continues to drink coffee and cola\par he had been drinking 20 cups per day and now 2; aware of impact on symptoms\par complaining of decreased libido\par marital discord; life stresses\par \par \par 9.14.2020\par referred to Dr Aguirre re renal mass\par \par 1.22.2021 s/p partial nephrectomy\par clear cell renal cell grade 2\par returns complaining of loss of libido and energy\par discussed need for followup\par discussed repeat CT in one year\par \par 10.31.2022\par patient s/p partial nephrectomy\par patient and wife llviing apart but "want to be together"\par concerned re sexual funciton

## 2022-10-31 NOTE — ASSESSMENT
[FreeTextEntry1] : Patient now asymptomatic after acute prostatits\par Has not had CT - approval obtained w/o contrast\par Needs followup re ureteral stone\par Complaining of low libido - will assess T and prolactin\par \par 9.4.2020 \par patient did not followup on CT\par again emphasized need for CT to followup renal mass\par continue Flomax 2 tabs\par patient interested in urololift when completes surgery for Basal cell\par \par complaining of loss of libido\par will recheck T\par discussed PSA \par \par referral to [facility] re symptomatic umbilical hernia\par \par \par 1.22.2021\par discussed libido ; multiple situational social issues; business issues; wife not interested in sexual intercourse \par prior T levels low when previously measured\par libido and energy levels worse\par complaining of loss of energy and libido\par exertional sob\par no chest pain\par emphasized  need for cardiology evaluation\par discussed 'counseling" re life stresses\par \par We had an extensive discussion re Risks of T replacement; Patient was informed about Black box warning from FDA.\par We discussed recent data regarding increased risk of coronary plaque size, heart disease; thrombolic event; increased Hbg/Hct, breast tenderness; acne; irritability; sleep apnea and increased urinary symptoms; effect on testicular function including suppression of spermatogenesis; hair loss (male pattern baldness) effect on LFTS; effect on prostate cancer.\par Different treatment approaches were discussed including IM, transdermal and Testopel\par We discussed advantages and disadvantages of each\par We discussed risk to T exposure to partners and children from transdermal appoaches.\par \par Patient needs to see cardiologist re SALEEM and lack of energy\par complaining of nocturia (drinking tea before sleep)\par continues on flomax\par discussed cessation tea and Pandya\par \par reviewed [facility] and need for followup after partial nephrectomy\par \par 10.31.2022\par [facility] - no followup noted with Dr Aguirre\par FOLLOW-UP AFTER SURGERY \par 1. Patients who have been managed with surgery (PN or RN) for a malignant renal mass should be \par classified into one of the following risk groups for surveillance: \par Low Risk (LR): pT1 and Grade 1/2\par Intermediate Risk (IR): pT1 and Grade 3/4 or pT2 any Grade\par High Risk (HR): pT3 any Grade \par Very High Risk (VHR): pT4 or pN1, or sarcomatoid/rhabdoid \par dedifferentiation, or macroscopic positive margin\par  If final microscopic surgical margins are positive for cancer, the risk category should be considered at \par least one level higher, and increased clinical vigilance should be exercised.\par 2. Patients managed with surgery (PN or RN) for a renal malignancy should undergo abdominal imaging \par according to Table 1, with CT or MRI pre- and post-intravenous contrast generally preferred. After 2 \par years, abdominal ultrasound alternating with cross-sectional imaging may be considered in the LR \par and IR groups at physician discretion. After 5 years, informed/shared decision-making should dictate \par further abdominal imaging. \par 3. Patients managed with surgery (PN or RN) for a renal malignancy should undergo chest imaging (CXR \par for LR and IR, and CT chest generally preferred for HR and VHR) according to Table 1. After 5 years, \par informed/shared decision-making discussion should dictate further chest imaging and CXR may be \par utilized instead of chest CT for HR and VHR\par \par AUA gudllines as above reviewed\par Will proceed with CT renal\par \par Patient is concerned regarding his sexual function.  He and his wife have not been sexually active.  He has a daughter with anorexia who is His wife and he apart.  We discussed his cardiac risk factors.  In light of his severe cardiac disease I have asked him to obtain cardiac clearance prior to the utilization of phosphodiesterase inhibitors.  He will follow through with his cardiologist in this regard.\par \par He also complains of nocturia.  He has symptoms of sleep apnea.  He has no residual urine.  He is on alpha blockers.  Prior to further intervention evaluation of his sleep apnea would be warranted.  This may also be affecting his sexual function.\par \par We also discussed PSA screening for prostate cancer.  He wished to proceed with this.\par \par Plan:\par 1.  PSA\par 2.  Testosterone estradiol\par 3.  Cardiac clearance regarding PDE 5 inhibitors\par 4.  Patient will obtain new PCP from cardiologist after the death of his PCP\par 5.  CT urogram re: renal clear cell carcinoma\par 6.  Follow-up in 3 months after sleep apnea assessment\par The BRET CHEEMA  expressed fully understanding of the information provided, the consequences and the management.\par \par

## 2022-11-29 ENCOUNTER — APPOINTMENT (OUTPATIENT)
Dept: CT IMAGING | Facility: CLINIC | Age: 57
End: 2022-11-29

## 2022-11-29 ENCOUNTER — OUTPATIENT (OUTPATIENT)
Dept: OUTPATIENT SERVICES | Facility: HOSPITAL | Age: 57
LOS: 1 days | End: 2022-11-29

## 2022-11-29 ENCOUNTER — TRANSCRIPTION ENCOUNTER (OUTPATIENT)
Age: 57
End: 2022-11-29

## 2022-11-29 DIAGNOSIS — Z41.9 ENCOUNTER FOR PROCEDURE FOR PURPOSES OTHER THAN REMEDYING HEALTH STATE, UNSPECIFIED: Chronic | ICD-10-CM

## 2022-11-29 DIAGNOSIS — Z98.890 OTHER SPECIFIED POSTPROCEDURAL STATES: Chronic | ICD-10-CM

## 2022-11-29 PROCEDURE — 74178 CT ABD&PLV WO CNTR FLWD CNTR: CPT | Mod: 26

## 2022-11-30 ENCOUNTER — TRANSCRIPTION ENCOUNTER (OUTPATIENT)
Age: 57
End: 2022-11-30

## 2023-01-17 ENCOUNTER — RX RENEWAL (OUTPATIENT)
Age: 58
End: 2023-01-17

## 2023-01-31 ENCOUNTER — APPOINTMENT (OUTPATIENT)
Dept: UROLOGY | Facility: CLINIC | Age: 58
End: 2023-01-31
Payer: COMMERCIAL

## 2023-01-31 VITALS
SYSTOLIC BLOOD PRESSURE: 117 MMHG | HEIGHT: 70 IN | HEART RATE: 72 BPM | OXYGEN SATURATION: 97 % | DIASTOLIC BLOOD PRESSURE: 71 MMHG | TEMPERATURE: 97.3 F | WEIGHT: 242 LBS | BODY MASS INDEX: 34.65 KG/M2

## 2023-01-31 DIAGNOSIS — L98.9 DISORDER OF THE SKIN AND SUBCUTANEOUS TISSUE, UNSPECIFIED: ICD-10-CM

## 2023-01-31 DIAGNOSIS — R37 SEXUAL DYSFUNCTION, UNSPECIFIED: ICD-10-CM

## 2023-01-31 DIAGNOSIS — C64.1 MALIGNANT NEOPLASM OF RIGHT KIDNEY, EXCEPT RENAL PELVIS: ICD-10-CM

## 2023-01-31 PROCEDURE — 99214 OFFICE O/P EST MOD 30 MIN: CPT

## 2023-01-31 NOTE — PHYSICAL EXAM
[Urethral Meatus] : meatus normal [Penis Abnormality] : normal uncircumcised penis [Epididymis] : the epididymides were normal [Testes Tenderness] : no tenderness of the testes [Testes Mass (___cm)] : there were no testicular masses [Anus Abnormality] : the anus and perineum were normal [Prostate Enlargement] : the prostate was not enlarged [Prostate Tenderness] : the prostate was not tender [No Prostate Nodules] : no prostate nodules [Prostate Tenderness] : was tender [FreeTextEntry1] : flat pigmented lesion on shaft ( see images)

## 2023-01-31 NOTE — ASSESSMENT
[FreeTextEntry1] : Patient now asymptomatic after acute prostatits\par Has not had CT - approval obtained w/o contrast\par Needs followup re ureteral stone\par Complaining of low libido - will assess T and prolactin\par \par 9.4.2020 \par patient did not followup on CT\par again emphasized need for CT to followup renal mass\par continue Flomax 2 tabs\par patient interested in urololift when completes surgery for Basal cell\par \par complaining of loss of libido\par will recheck T\par discussed PSA \par \par referral to [facility] re symptomatic umbilical hernia\par \par \par 1.22.2021\par discussed libido ; multiple situational social issues; business issues; wife not interested in sexual intercourse \par prior T levels low when previously measured\par libido and energy levels worse\par complaining of loss of energy and libido\par exertional sob\par no chest pain\par emphasized  need for cardiology evaluation\par discussed 'counseling" re life stresses\par \par We had an extensive discussion re Risks of T replacement; Patient was informed about Black box warning from FDA.\par We discussed recent data regarding increased risk of coronary plaque size, heart disease; thrombolic event; increased Hbg/Hct, breast tenderness; acne; irritability; sleep apnea and increased urinary symptoms; effect on testicular function including suppression of spermatogenesis; hair loss (male pattern baldness) effect on LFTS; effect on prostate cancer.\par Different treatment approaches were discussed including IM, transdermal and Testopel\par We discussed advantages and disadvantages of each\par We discussed risk to T exposure to partners and children from transdermal appoaches.\par \par Patient needs to see cardiologist re SALEEM and lack of energy\par complaining of nocturia (drinking tea before sleep)\par continues on flomax\par discussed cessation tea and Pandya\par \par reviewed RCC and need for followup after partial nephrectomy\par \par 10.31.2022\par RCC - no followup noted with Dr Aguirre\par FOLLOW-UP AFTER SURGERY \par 1. Patients who have been managed with surgery (PN or RN) for a malignant renal mass should be \par classified into one of the following risk groups for surveillance: \par Low Risk (LR): pT1 and Grade 1/2\par Intermediate Risk (IR): pT1 and Grade 3/4 or pT2 any Grade\par High Risk (HR): pT3 any Grade \par Very High Risk (VHR): pT4 or pN1, or sarcomatoid/rhabdoid \par dedifferentiation, or macroscopic positive margin\par  If final microscopic surgical margins are positive for cancer, the risk category should be considered at \par least one level higher, and increased clinical vigilance should be exercised.\par 2. Patients managed with surgery (PN or RN) for a renal malignancy should undergo abdominal imaging \par according to Table 1, with CT or MRI pre- and post-intravenous contrast generally preferred. After 2 \par years, abdominal ultrasound alternating with cross-sectional imaging may be considered in the LR \par and IR groups at physician discretion. After 5 years, informed/shared decision-making should dictate \par further abdominal imaging. \par 3. Patients managed with surgery (PN or RN) for a renal malignancy should undergo chest imaging (CXR \par for LR and IR, and CT chest generally preferred for HR and VHR) according to Table 1. After 5 years, \par informed/shared decision-making discussion should dictate further chest imaging and CXR may be \par utilized instead of chest CT for HR and VHR\par \par AUA gudllines as above reviewed\par Will proceed with CT renal\par \par Patient is concerned regarding his sexual function.  He and his wife have not been sexually active.  He has a daughter with anorexia who is His wife and he apart.  We discussed his cardiac risk factors.  In light of his severe cardiac disease I have asked him to obtain cardiac clearance prior to the utilization of phosphodiesterase inhibitors.  He will follow through with his cardiologist in this regard.\par \par He also complains of nocturia.  He has symptoms of sleep apnea.  He has no residual urine.  He is on alpha blockers.  Prior to further intervention evaluation of his sleep apnea would be warranted.  This may also be affecting his sexual function.\par \par We also discussed PSA screening for prostate cancer.  He wished to proceed with this.\par \par Plan:\par 1.  PSA\par 2.  Testosterone estradiol\par 3.  Cardiac clearance regarding PDE 5 inhibitors\par 4.  Patient will obtain new PCP from cardiologist after the death of his PCP\par 5.  CT urogram re: renal clear cell carcinoma\par 6.  Follow-up in 3 months after sleep apnea assessment\par The BRET CHEEMA  expressed fully understanding of the information provided, the consequences and the management.\par \par \par 1.31.2023\par did not undergo evaluation of sleep apnea\par planning to proceed with Dr Fisher\par IPSS 7\par continues with nocturia\par mild Diuril symptoms\par continues on Flomax\par interested in potential intervention for LUTS although states his symptoms have improved with decrease caffeinated products and coffee\par discussed potential impact of sleep apnea and need to followup\par \par discussed elective circumcision for cosmeses\par concerned re appearance of size of penis\par discussed weight reduction\par \par dsicussed shave biopsy of penile pigmented lesion\par patient wishes to observe\par \par RCC - CT reviewed with patient\par \par Plan:\par 1. t/free t\par 2. ua and culture\par 3. fu cystoscopy with nitrous\par \par \par CT s/p partial nephrectomy\par

## 2023-01-31 NOTE — HISTORY OF PRESENT ILLNESS
[FreeTextEntry1] : 51 year old male with history of stone (left side 9 mm) one year ago. Had elevated PSA which he did not followup.\par Had been on Flomax 2 tabs for urinary symptoms and stone disease.\par No followup for 1 year.\par \par \par 5.18.2017\par no further flank pain\par did not pass any further stone\par no pain; no longer straining urine. \par urinary symptoms improved on Flomax 2 tabs daily\par improved on 2 tablets; patient does not believe his symptoms warrant intervention.\par \par 11.16.2017\par has not passed stone / passage of small stone\par urinary symptoms continue to be much improved\par no further episodes of pain or hematuria\par \par 5.7.2018\par no further flank pain\par urinating with good stream\par nocturia x 1\par stopped drinking MATE caffeinated product \par \par 11.9.2018\par patient did not get renal usg re ? ureteral stone passage\par Patient thinks he passed ? small stone\par was supposed to get USG but did not because of excision of back cyst\par taking Tamsulosin\par when stopped procedure symtoms get worse and unhappy\par \par 5.10.2019\par 6 days ago developed \par developed urinary symptoms\par dysuria and frequency\par called relative treated nitrufurantoin\par mild improvement\par also complaining of constipation\par after laxative symtoms \par continues tamsulosin 2 tabs daily \par \par 5.31.2019\par returns after treatment of prostatitis\par \par \par 9.4.2020\par continues with LUTS\par on tamsulosin 2 tabs daily\par continues to drink coffee and cola\par he had been drinking 20 cups per day and now 2; aware of impact on symptoms\par complaining of decreased libido\par marital discord; life stresses\par \par \par 9.14.2020\par referred to Dr Aguirre re renal mass\par \par 1.22.2021 s/p partial nephrectomy\par clear cell renal cell grade 2\par returns complaining of loss of libido and energy\par discussed need for followup\par discussed repeat CT in one year\par \par 10.31.2022\par patient s/p partial nephrectomy\par patient and wife living apart but "want to be together"\par concerned re sexual function\par \par 1.31.2022\par seen in urgent care of ? allergic reaction on scrotum and legs on week\par no sexual activity\par triamcinolone\par methylprednisone dose pack\par responded to treatment\par ? change of soap\par continues to be  not with wife

## 2023-02-01 LAB
APPEARANCE: CLEAR
BACTERIA: NEGATIVE
BILIRUBIN URINE: NEGATIVE
BLOOD URINE: NEGATIVE
COLOR: YELLOW
GLUCOSE QUALITATIVE U: NEGATIVE
HYALINE CASTS: 2 /LPF
KETONES URINE: NORMAL
LEUKOCYTE ESTERASE URINE: NEGATIVE
MICROSCOPIC-UA: NORMAL
NITRITE URINE: NEGATIVE
PH URINE: 6
PROTEIN URINE: ABNORMAL
RED BLOOD CELLS URINE: 2 /HPF
SPECIFIC GRAVITY URINE: 1.03
SQUAMOUS EPITHELIAL CELLS: 1 /HPF
UROBILINOGEN URINE: NORMAL
WHITE BLOOD CELLS URINE: 2 /HPF

## 2023-02-02 LAB — BACTERIA UR CULT: NORMAL

## 2023-03-17 ENCOUNTER — TRANSCRIPTION ENCOUNTER (OUTPATIENT)
Age: 58
End: 2023-03-17

## 2023-04-17 ENCOUNTER — RX RENEWAL (OUTPATIENT)
Age: 58
End: 2023-04-17

## 2023-05-02 ENCOUNTER — LABORATORY RESULT (OUTPATIENT)
Age: 58
End: 2023-05-02

## 2023-05-02 ENCOUNTER — APPOINTMENT (OUTPATIENT)
Dept: CARDIOLOGY | Facility: CLINIC | Age: 58
End: 2023-05-02
Payer: COMMERCIAL

## 2023-05-02 ENCOUNTER — NON-APPOINTMENT (OUTPATIENT)
Age: 58
End: 2023-05-02

## 2023-05-02 VITALS
DIASTOLIC BLOOD PRESSURE: 80 MMHG | SYSTOLIC BLOOD PRESSURE: 130 MMHG | HEIGHT: 70 IN | BODY MASS INDEX: 35.22 KG/M2 | TEMPERATURE: 98.2 F | WEIGHT: 246 LBS | RESPIRATION RATE: 16 BRPM | HEART RATE: 66 BPM | OXYGEN SATURATION: 96 %

## 2023-05-02 DIAGNOSIS — J06.9 ACUTE UPPER RESPIRATORY INFECTION, UNSPECIFIED: ICD-10-CM

## 2023-05-02 PROCEDURE — 93000 ELECTROCARDIOGRAM COMPLETE: CPT

## 2023-05-02 PROCEDURE — 99214 OFFICE O/P EST MOD 30 MIN: CPT | Mod: 25

## 2023-05-02 NOTE — DISCUSSION/SUMMARY
[FreeTextEntry1] : This is a 57-year-old male with past medical history significant for coronary artery disease status post myocardial infarction, status post coronary artery bypass surgery in February 2016 at [facility], hyperlipidemia, hypertension, comes in for followup cardiac evaluation.\par He denies chest pain, shortness of breath, dizziness or syncope.  \par The patient had an episode of dizziness and vertigo (the room was spinning) approximately 10 days ago.  At that time he checked his blood pressure once and it was 190/110.  He checked it later in the day and it was better and it was normal the next day.  Is unclear what contributed to this isolated reading.\par His blood pressure is elevated today at 142/90.  He reports that he is taking his medications on a regular basis.\par He does not wish to start on diuretic therapy as he has frequent urination.\par I have asked him to start amlodipine 5 mg in the morning, and he will schedule 24-hour blood pressure monitor.\par I have also asked him to schedule sleep study rule out sleep apnea.\par \par Electrocardiogram done January 27, 2022 demonstrated normal sinus rhythm rate 60 beats per minute is otherwise remarkable for left axis deviation and left atrial abnormality.\par Blood work done January 28, 2022 demonstrated lipoprotein B of 49, direct LDL of 47, hemoglobin A1c of 6.0,\par \par Echo Doppler examination done November 6, 2020 demonstrated minimal to mild mitral valve regurgitation, mild tricuspid valve regurgitation, mild pulmonic valve regurgitation, aortic valve thickening and sclerosis with mild to moderate aortic valve regurgitation and satisfactory left ventricular function with estimated ejection fraction of 50-55%.\par He is currently hemodynamically stable and asymptomatic from cardiac standpoint.\par The patient had laparoscopic removal of a right kidney lesion and ventral hernia repair November 12, 2020.\par He had a normal exercise stress test November 6, 2020.\par Electrocardiogram done March 12, 2020 demonstrated normal sinus rhythm at a rate of 78 bpm is otherwise remarkable for left axis deviation, and left atrial abnormality.\par \par The patient understands that aerobic exercises must be increased to 40 minutes 4 times per week. A detailed discussion of lifestyle modification was done today. The patient has a good understanding of the diagnosis, and treatment plan. Lifestyle modification was also outlined.

## 2023-05-02 NOTE — ASSESSMENT
[FreeTextEntry1] : This is a 57-year-old male with past medical history significant for coronary artery disease status post myocardial infarction, status post coronary artery bypass surgery in February 2016 at [facility], hyperlipidemia, hypertension, comes in for followup cardiac evaluation.\par \par CHIEF COMPLAINT:\par Today he is feeling generally well and does not have any complaints at this time.  He follows up closely with his urologist Dr. Mason who states that he would like to try a medication for ED and would like to know if he is clear from a cardiac standpoint to try this medication.  Otherwise he is feeling well but does have bilateral ankle swelling which he states resolves in the mornings.  He states his diet is not the best and he does consume foods high in sodium which may be contributing to his ankle swelling.  He denies any dyspnea on exertion but is currently experiencing an upper respiratory infection possible allergies.\par \par He is currently prescribed amlodipine 5 mg daily, ezetimibe 10 mg daily, metoprolol succinate 50 mg 1 tablet daily, aspirin 325 mg tablets, rosuvastatin 20 mg daily and telmisartan 80 mg daily.\par \par PMH:\par The patient had an episode of dizziness and vertigo (the room was spinning) approximately 10 days ago.  At that time he checked his blood pressure once and it was 190/110.  He checked it later in the day and it was better and it was normal the next day.  Is unclear what contributed to this isolated reading.\par \par BLOOD PRESSURE:\par -BP is controlled in today's visit.\par \par BLOOD WORK:\par -New blood work was done 05/02/2023 to evaluate lipid profile, CBC, BMP, hepatic function, A1C and TSH\par \par Blood work done January 28, 2022 demonstrated lipoprotein B of 49, direct LDL of 47, hemoglobin A1c of 6.0,\par \par TESTING/REPORTS:\par -EKG done May 02, 2023 which demonstrated regular sinus rhythm with nonspecific ST-T wave changes, BPM of 66.\par \par -Electrocardiogram done January 27, 2022 demonstrated normal sinus rhythm rate 60 beats per minute is otherwise remarkable for left axis deviation and left atrial abnormality.\par \par -Echo Doppler examination done November 6, 2020 demonstrated minimal to mild mitral valve regurgitation, mild tricuspid valve regurgitation, mild pulmonic valve regurgitation, aortic valve thickening and sclerosis with mild to moderate aortic valve regurgitation and satisfactory left ventricular function with estimated ejection fraction of 50-55%.\par \par -The patient had laparoscopic removal of a right kidney lesion and ventral hernia repair November 12, 2020.\par \par -He had a normal exercise stress test November 6, 2020.\par \par -Electrocardiogram done March 12, 2020 demonstrated normal sinus rhythm at a rate of 78 bpm is otherwise remarkable for left axis deviation, and left atrial abnormality.\par \par PLAN:\par -The patient is clinically stable from a cardiac standpoint on today's exam.\par -He will start azithromycin/Z-Abdulaziz to help resolve his upper respiratory infection.\par -The patient will schedule an Echo Doppler examination to evaluate murmur, left ventricular function, chamber size, and rule out hypertrophy. \par -He is cleared from a cardiac standpoint to start medication in regards to his erectile dysfunction and will discuss this with his urologist.\par \par I have discussed the plan of care with Mr. BRET CHEEMA  and he  will follow up in 4-6 months. He is compliant with all of his medications.\par The patient understands that aerobic exercises must be increased to at least 20 minutes 4 times/week along with maintaining lifestyle modifications including well-maintained diet. He will contact me at the office for any questions with their care or any changes in their health status.\par \par Makayla AGUILAR

## 2023-09-13 ENCOUNTER — APPOINTMENT (OUTPATIENT)
Dept: CARDIOLOGY | Facility: CLINIC | Age: 58
End: 2023-09-13
Payer: COMMERCIAL

## 2023-09-13 VITALS
RESPIRATION RATE: 16 BRPM | BODY MASS INDEX: 33.5 KG/M2 | WEIGHT: 234 LBS | TEMPERATURE: 97.5 F | HEART RATE: 63 BPM | DIASTOLIC BLOOD PRESSURE: 80 MMHG | HEIGHT: 70 IN | OXYGEN SATURATION: 98 % | SYSTOLIC BLOOD PRESSURE: 118 MMHG

## 2023-09-13 DIAGNOSIS — R06.09 OTHER FORMS OF DYSPNEA: ICD-10-CM

## 2023-09-13 PROCEDURE — 99214 OFFICE O/P EST MOD 30 MIN: CPT

## 2023-09-13 PROCEDURE — 93306 TTE W/DOPPLER COMPLETE: CPT

## 2023-09-13 RX ORDER — AZITHROMYCIN 250 MG/1
250 TABLET, FILM COATED ORAL
Qty: 1 | Refills: 0 | Status: DISCONTINUED | COMMUNITY
Start: 2023-05-02 | End: 2023-09-13

## 2023-09-13 RX ORDER — ASPIRIN 81 MG
81 TABLET, DELAYED RELEASE (ENTERIC COATED) ORAL
Refills: 0 | Status: ACTIVE | COMMUNITY

## 2023-09-13 RX ORDER — TELMISARTAN 80 MG/1
80 TABLET ORAL
Qty: 90 | Refills: 1 | Status: DISCONTINUED | COMMUNITY
Start: 2017-12-18 | End: 2023-09-13

## 2023-10-22 PROBLEM — R06.09 DYSPNEA ON EXERTION: Status: ACTIVE | Noted: 2018-05-03

## 2023-11-09 RX ORDER — EZETIMIBE 10 MG/1
10 TABLET ORAL
Qty: 90 | Refills: 1 | Status: DISCONTINUED | COMMUNITY
Start: 2020-11-06 | End: 2023-09-13

## 2023-12-18 DIAGNOSIS — R12 HEARTBURN: ICD-10-CM

## 2023-12-18 RX ORDER — ESOMEPRAZOLE MAGNESIUM 40 MG/1
40 CAPSULE, DELAYED RELEASE ORAL DAILY
Qty: 90 | Refills: 0 | Status: COMPLETED | COMMUNITY
Start: 2019-08-29 | End: 2023-12-18

## 2024-02-28 ENCOUNTER — APPOINTMENT (OUTPATIENT)
Dept: CARDIOLOGY | Facility: CLINIC | Age: 59
End: 2024-02-28

## 2024-03-07 ENCOUNTER — RX RENEWAL (OUTPATIENT)
Age: 59
End: 2024-03-07

## 2024-03-11 LAB
ALBUMIN SERPL ELPH-MCNC: 4.5 G/DL
ALP BLD-CCNC: 77 U/L
ALT SERPL-CCNC: 24 U/L
ANION GAP SERPL CALC-SCNC: 16 MMOL/L
AST SERPL-CCNC: 19 U/L
BILIRUB DIRECT SERPL-MCNC: 0.2 MG/DL
BILIRUB INDIRECT SERPL-MCNC: 0.5 MG/DL
BILIRUB SERPL-MCNC: 0.8 MG/DL
BUN SERPL-MCNC: 17 MG/DL
CALCIUM SERPL-MCNC: 9.5 MG/DL
CHLORIDE SERPL-SCNC: 103 MMOL/L
CHOLEST SERPL-MCNC: 107 MG/DL
CO2 SERPL-SCNC: 24 MMOL/L
CREAT SERPL-MCNC: 1.24 MG/DL
EGFR: 67 ML/MIN/1.73M2
ESTIMATED AVERAGE GLUCOSE: 128 MG/DL
GLUCOSE SERPL-MCNC: 128 MG/DL
HBA1C MFR BLD HPLC: 6.1 %
HDLC SERPL-MCNC: 47 MG/DL
LDLC SERPL CALC-MCNC: 38 MG/DL
MAGNESIUM SERPL-MCNC: 2 MG/DL
NONHDLC SERPL-MCNC: 60 MG/DL
POTASSIUM SERPL-SCNC: 3.6 MMOL/L
PROT SERPL-MCNC: 6.9 G/DL
SODIUM SERPL-SCNC: 143 MMOL/L
TRIGL SERPL-MCNC: 126 MG/DL
TSH SERPL-ACNC: 1.06 UIU/ML
URATE SERPL-MCNC: 6.8 MG/DL

## 2024-03-20 ENCOUNTER — NON-APPOINTMENT (OUTPATIENT)
Age: 59
End: 2024-03-20

## 2024-04-03 ENCOUNTER — APPOINTMENT (OUTPATIENT)
Dept: CARDIOLOGY | Facility: CLINIC | Age: 59
End: 2024-04-03
Payer: COMMERCIAL

## 2024-04-03 ENCOUNTER — NON-APPOINTMENT (OUTPATIENT)
Age: 59
End: 2024-04-03

## 2024-04-03 VITALS
BODY MASS INDEX: 34.36 KG/M2 | OXYGEN SATURATION: 97 % | SYSTOLIC BLOOD PRESSURE: 127 MMHG | HEART RATE: 85 BPM | RESPIRATION RATE: 16 BRPM | DIASTOLIC BLOOD PRESSURE: 82 MMHG | HEIGHT: 70 IN | TEMPERATURE: 97.9 F | WEIGHT: 240 LBS

## 2024-04-03 DIAGNOSIS — E78.5 HYPERLIPIDEMIA, UNSPECIFIED: ICD-10-CM

## 2024-04-03 DIAGNOSIS — I25.10 ATHEROSCLEROTIC HEART DISEASE OF NATIVE CORONARY ARTERY W/OUT ANGINA PECTORIS: ICD-10-CM

## 2024-04-03 DIAGNOSIS — R01.1 CARDIAC MURMUR, UNSPECIFIED: ICD-10-CM

## 2024-04-03 DIAGNOSIS — Z95.1 PRESENCE OF AORTOCORONARY BYPASS GRAFT: ICD-10-CM

## 2024-04-03 DIAGNOSIS — I10 ESSENTIAL (PRIMARY) HYPERTENSION: ICD-10-CM

## 2024-04-03 DIAGNOSIS — I25.2 OLD MYOCARDIAL INFARCTION: ICD-10-CM

## 2024-04-03 PROCEDURE — G2211 COMPLEX E/M VISIT ADD ON: CPT | Mod: NC,1L

## 2024-04-03 PROCEDURE — 99214 OFFICE O/P EST MOD 30 MIN: CPT | Mod: 25

## 2024-04-03 PROCEDURE — 93000 ELECTROCARDIOGRAM COMPLETE: CPT

## 2024-04-03 NOTE — REASON FOR VISIT
[CV Risk Factors and Non-Cardiac Disease] : CV risk factors and non-cardiac disease [Arrhythmia/ECG Abnorrmalities] : arrhythmia/ECG abnormalities [Coronary Artery Disease] : coronary artery disease [Other: ____] : [unfilled] [Follow-Up - Clinic] : a clinic follow-up of [Hypertension] : hypertension [Hyperlipidemia] : hyperlipidemia [Prior Myocardial Infarction] : a prior myocardial infarction [FreeTextEntry1] : This is a 58-year-old male with past medical history significant for coronary artery disease status post myocardial infarction, status post coronary artery bypass surgery in February 2016 at [facility], hyperlipidemia, hypertension, comes in for followup cardiac evaluation.  Echo Doppler examination done November 6, 2020 demonstrated minimal to mild mitral valve regurgitation, mild tricuspid valve regurgitation, mild pulmonic valve regurgitation, aortic valve thickening and sclerosis with mild to moderate aortic valve regurgitation and satisfactory left ventricular function with estimated ejection fraction of 50-55%.  He had a normal exercise stress test November 6, 2020.  Patient does not have any complains today. States that due to decreased activity he has gained ~6 pounds. No chest pain, SOB, palpitations, headaches, nausea or vomiting reported.   #HTN -  because of leg swelling, amlodipine and telmisartan was changed to telmisartan-HCTZ 80-12.5 mg oral daily   - leg swelling resolved  #HLD #Secondary prevention #ASCVD risk optimization  - (2/2024): , HDL 47, , LDL 38 - (5/2023): LDL direct 45, HDL 50, ,  - c/w Zetia 10 mg oral daily - c/w Rosuvastatin 20 mg oral daily   #S/p CABG - c/w metoprolol ER 50 mg oral daily  - c/w 81 mg oral daily with food  #PreDM - A1C as above - patient does not have a personal or family hx of thyroid cancer or MEN syndrome  - discussed with patient with benefits of GLPs at this time, he would lie to work with Essence Sigala before trying medications   #Advised patient to see a GI for chronic PPI use. GI referrals given.  Follow up with Dr Fisher in 6 months.

## 2024-04-03 NOTE — PHYSICAL EXAM
[Well Developed] : well developed [Well Nourished] : well nourished [No Acute Distress] : no acute distress [Normal Venous Pressure] : normal venous pressure [No Carotid Bruit] : no carotid bruit [No Rub] : no rub [Normal S1, S2] : normal S1, S2 [No Gallop] : no gallop heard [Clear Lung Fields] : clear lung fields [Good Air Entry] : good air entry [No Respiratory Distress] : no respiratory distress  [Soft] : abdomen soft [Non Tender] : non-tender [Normal Bowel Sounds] : normal bowel sounds [No Masses/organomegaly] : no masses/organomegaly [No Edema] : no edema [Normal Gait] : normal gait [No Clubbing] : no clubbing [No Cyanosis] : no cyanosis [No Varicosities] : no varicosities [No Rash] : no rash [No Skin Lesions] : no skin lesions [Moves all extremities] : moves all extremities [Normal Speech] : normal speech [No Focal Deficits] : no focal deficits [Alert and Oriented] : alert and oriented [Normal memory] : normal memory [General Appearance - Well Developed] : well developed [Normal Appearance] : normal appearance [Well Groomed] : well groomed [General Appearance - Well Nourished] : well nourished [No Deformities] : no deformities [General Appearance - In No Acute Distress] : no acute distress [Normal Conjunctiva] : the conjunctiva exhibited no abnormalities [Normal Oral Mucosa] : normal oral mucosa [Normal Jugular Venous A Waves Present] : normal jugular venous A waves present [Normal Jugular Venous V Waves Present] : normal jugular venous V waves present [No Jugular Venous Chacko A Waves] : no jugular venous chacko A waves [Respiration, Rhythm And Depth] : normal respiratory rhythm and effort [Exaggerated Use Of Accessory Muscles For Inspiration] : no accessory muscle use [Auscultation Breath Sounds / Voice Sounds] : lungs were clear to auscultation bilaterally [Abdomen Soft] : soft [Abnormal Walk] : normal gait [Nail Clubbing] : no clubbing of the fingernails [Cyanosis, Localized] : no localized cyanosis [Petechial Hemorrhages (___cm)] : no petechial hemorrhages [Skin Color & Pigmentation] : normal skin color and pigmentation [] : no rash [Skin Lesions] : no skin lesions [No Venous Stasis] : no venous stasis [No Skin Ulcers] : no skin ulcer [No Xanthoma] : no  xanthoma was observed [Oriented To Time, Place, And Person] : oriented to person, place, and time [Affect] : the affect was normal [Mood] : the mood was normal [No Anxiety] : not feeling anxious [5th Left ICS - MCL] : palpated at the 5th LICS in the midclavicular line [No Precordial Heave] : no precordial heave was noted [Normal] : normal [Normal Rate] : normal [Rhythm Regular] : regular [Normal S1] : normal S1 [Normal S2] : normal S2 [I] : a grade 1 [2+] : right 2+ [No Abnormalities] : the abdominal aorta was not enlarged and no bruit was heard [No Pitting Edema] : no pitting edema present [S3] : no S3 [S4] : no S4 [Right Carotid Bruit] : no bruit heard over the right carotid [Left Carotid Bruit] : no bruit heard over the left carotid [Right Femoral Bruit] : no bruit heard over the right femoral artery [Left Femoral Bruit] : no bruit heard over the left femoral artery [Click] : no click [Pericardial Rub] : no pericardial rub

## 2024-04-03 NOTE — DISCUSSION/SUMMARY
[FreeTextEntry1] : This is a 58-year-old male with past medical history significant for coronary artery disease status post myocardial infarction, status post coronary artery bypass surgery in February 2016 at [facility], hyperlipidemia, hypertension, comes in for followup cardiac evaluation. He denies chest pain, shortness of breath, dizziness or syncope. Electrocardiogram done April 3, 2024 demonstrated normal sinus rhythm rate 76 bpm is otherwise remarkable for nonspecific ST wave changes. The patient is concerned about weight gain.  He is interested in changing his diet and motivated.  I recommended he work with a registered dietitian to achieve weight loss goals. He is also a good candidate for GLP-1 agonist therapy.  He has no personal family history of medullary thyroid cancer or M EN syndrome.  However he does not wish to self inject and he prefers to not truly lose weight. I will also asked him to increase aerobic activity to least 40 minutes 4 times per week. Lipid panel done February 26, 2024 demonstrated a cholesterol 107, HDL 47, triglycerides 126, LDL 38, non-HDL cholesterol 60 mg/dL and hemoglobin A1c of 6.1.  The patient will continue his Zetia 10 mg daily and continue his current dose of Crestor 20 mg daily.  The patient had been on amlodipine 5 mg after dinner. He is currently taking amlodipine as needed based on his own blood pressure readings.  I have explained to him that he should be taking it nightly.  He will take this under consideration He does not wish to start on diuretic therapy as he has frequent urination. I have also asked him to schedule sleep study rule out sleep apnea.  Electrocardiogram done January 27, 2022 demonstrated normal sinus rhythm rate 60 beats per minute is otherwise remarkable for left axis deviation and left atrial abnormality. Blood work done January 28, 2022 demonstrated lipoprotein B of 49, direct LDL of 47, hemoglobin A1c of 6.0,  Echo Doppler examination done November 6, 2020 demonstrated minimal to mild mitral valve regurgitation, mild tricuspid valve regurgitation, mild pulmonic valve regurgitation, aortic valve thickening and sclerosis with mild to moderate aortic valve regurgitation and satisfactory left ventricular function with estimated ejection fraction of 50-55%. He is currently hemodynamically stable and asymptomatic from cardiac standpoint. The patient had laparoscopic removal of a right kidney lesion and ventral hernia repair November 12, 2020. He had a normal exercise stress test November 6, 2020. Electrocardiogram done March 12, 2020 demonstrated normal sinus rhythm at a rate of 78 bpm is otherwise remarkable for left axis deviation, and left atrial abnormality.  The patient understands that aerobic exercises must be increased to 40 minutes 4 times per week. A detailed discussion of lifestyle modification was done today. The patient has a good understanding of the diagnosis, and treatment plan. Lifestyle modification was also outlined.

## 2024-04-03 NOTE — ASSESSMENT
[FreeTextEntry1] : Prior note nurse practitioner Edmund May 2, 2023::  This is a 57-year-old male with past medical history significant for coronary artery disease status post myocardial infarction, status post coronary artery bypass surgery in February 2016 at [facility], hyperlipidemia, hypertension, comes in for followup cardiac evaluation.  CHIEF COMPLAINT: Today he is feeling generally well and does not have any complaints at this time.  He follows up closely with his urologist Dr. Mason who states that he would like to try a medication for ED and would like to know if he is clear from a cardiac standpoint to try this medication.  Otherwise he is feeling well but does have bilateral ankle swelling which he states resolves in the mornings.  He states his diet is not the best and he does consume foods high in sodium which may be contributing to his ankle swelling.  He denies any dyspnea on exertion but is currently experiencing an upper respiratory infection possible allergies.  He is currently prescribed amlodipine 5 mg daily, ezetimibe 10 mg daily, metoprolol succinate 50 mg 1 tablet daily, aspirin 325 mg tablets, rosuvastatin 20 mg daily and telmisartan 80 mg daily.  PMH: The patient had an episode of dizziness and vertigo (the room was spinning) approximately 10 days ago.  At that time he checked his blood pressure once and it was 190/110.  He checked it later in the day and it was better and it was normal the next day.  Is unclear what contributed to this isolated reading.  BLOOD PRESSURE: -BP is controlled in today's visit.  BLOOD WORK: -New blood work was done 05/02/2023 to evaluate lipid profile, CBC, BMP, hepatic function, A1C and TSH  Blood work done January 28, 2022 demonstrated lipoprotein B of 49, direct LDL of 47, hemoglobin A1c of 6.0,  TESTING/REPORTS: -EKG done May 02, 2023 which demonstrated regular sinus rhythm with nonspecific ST-T wave changes, BPM of 66.  -Electrocardiogram done January 27, 2022 demonstrated normal sinus rhythm rate 60 beats per minute is otherwise remarkable for left axis deviation and left atrial abnormality.  -Echo Doppler examination done November 6, 2020 demonstrated minimal to mild mitral valve regurgitation, mild tricuspid valve regurgitation, mild pulmonic valve regurgitation, aortic valve thickening and sclerosis with mild to moderate aortic valve regurgitation and satisfactory left ventricular function with estimated ejection fraction of 50-55%.  -The patient had laparoscopic removal of a right kidney lesion and ventral hernia repair November 12, 2020.  -He had a normal exercise stress test November 6, 2020.  -Electrocardiogram done March 12, 2020 demonstrated normal sinus rhythm at a rate of 78 bpm is otherwise remarkable for left axis deviation, and left atrial abnormality.  PLAN: -The patient is clinically stable from a cardiac standpoint on today's exam. -He will start azithromycin/Z-Abdulaziz to help resolve his upper respiratory infection. -The patient will schedule an Echo Doppler examination to evaluate murmur, left ventricular function, chamber size, and rule out hypertrophy.  -He is cleared from a cardiac standpoint to start medication in regards to his erectile dysfunction and will discuss this with his urologist.  I have discussed the plan of care with Mr. BRET CHEEMA  and he  will follow up in 4-6 months. He is compliant with all of his medications. The patient understands that aerobic exercises must be increased to at least 20 minutes 4 times/week along with maintaining lifestyle modifications including well-maintained diet. He will contact me at the office for any questions with their care or any changes in their health status.  Makayla AGUILAR

## 2024-04-04 RX ORDER — PANTOPRAZOLE 40 MG/1
40 TABLET, DELAYED RELEASE ORAL
Qty: 90 | Refills: 0 | Status: DISCONTINUED | COMMUNITY
Start: 2023-12-18 | End: 2024-04-04

## 2024-05-13 ENCOUNTER — RX RENEWAL (OUTPATIENT)
Age: 59
End: 2024-05-13

## 2024-05-14 RX ORDER — POTASSIUM CHLORIDE 750 MG/1
10 CAPSULE, EXTENDED RELEASE ORAL
Qty: 40 | Refills: 1 | Status: ACTIVE | COMMUNITY
Start: 2024-05-13 | End: 1900-01-01

## 2024-05-14 RX ORDER — TELMISARTAN AND HYDROCHLOROTHIAZIDE 80; 12.5 MG/1; MG/1
80-12.5 TABLET ORAL
Qty: 90 | Refills: 1 | Status: ACTIVE | COMMUNITY
Start: 2024-05-13 | End: 1900-01-01

## 2024-06-12 ENCOUNTER — APPOINTMENT (OUTPATIENT)
Dept: UROLOGY | Facility: CLINIC | Age: 59
End: 2024-06-12
Payer: COMMERCIAL

## 2024-06-12 VITALS
OXYGEN SATURATION: 99 % | HEART RATE: 67 BPM | DIASTOLIC BLOOD PRESSURE: 76 MMHG | TEMPERATURE: 97.6 F | SYSTOLIC BLOOD PRESSURE: 121 MMHG

## 2024-06-12 DIAGNOSIS — Z85.528 PERSONAL HISTORY OF OTHER MALIGNANT NEOPLASM OF KIDNEY: ICD-10-CM

## 2024-06-12 DIAGNOSIS — R97.20 ELEVATED PROSTATE, SPECIFIC ANTIGEN [PSA]: ICD-10-CM

## 2024-06-12 DIAGNOSIS — R35.0 FREQUENCY OF MICTURITION: ICD-10-CM

## 2024-06-12 DIAGNOSIS — R68.82 DECREASED LIBIDO: ICD-10-CM

## 2024-06-12 PROCEDURE — 99214 OFFICE O/P EST MOD 30 MIN: CPT

## 2024-06-12 PROCEDURE — G2211 COMPLEX E/M VISIT ADD ON: CPT | Mod: NC

## 2024-06-12 RX ORDER — TAMSULOSIN HYDROCHLORIDE 0.4 MG/1
0.4 CAPSULE ORAL
Qty: 60 | Refills: 0 | Status: ACTIVE | COMMUNITY
Start: 2017-11-06 | End: 1900-01-01

## 2024-06-13 LAB
APPEARANCE: CLEAR
BACTERIA: NEGATIVE /HPF
BILIRUBIN URINE: NEGATIVE
BLOOD URINE: NEGATIVE
CAST: 0 /LPF
COLOR: YELLOW
EPITHELIAL CELLS: 0 /HPF
GLUCOSE QUALITATIVE U: NEGATIVE MG/DL
KETONES URINE: NEGATIVE MG/DL
LEUKOCYTE ESTERASE URINE: NEGATIVE
MICROSCOPIC-UA: NORMAL
NITRITE URINE: NEGATIVE
PH URINE: 5.5
PROLACTIN SERPL-MCNC: 6.8 NG/ML
PROTEIN URINE: NEGATIVE MG/DL
PSA SERPL-MCNC: 4.55 NG/ML
RED BLOOD CELLS URINE: 2 /HPF
SPECIFIC GRAVITY URINE: 1.02
TESTOST SERPL-MCNC: 295 NG/DL
UROBILINOGEN URINE: 0.2 MG/DL
WHITE BLOOD CELLS URINE: 0 /HPF

## 2024-06-13 NOTE — ASSESSMENT
[FreeTextEntry1] : Patient now asymptomatic after acute prostatits Has not had CT - approval obtained w/o contrast Needs followup re ureteral stone Complaining of low libido - will assess T and prolactin  9.4.2020  patient did not followup on CT again emphasized need for CT to followup renal mass continue Flomax 2 tabs patient interested in urololift when completes surgery for Basal cell  complaining of loss of libido will recheck T discussed PSA   referral to Isac Norman re symptomatic umbilical hernia   1.22.2021 discussed libido ; multiple situational social issues; business issues; wife not interested in sexual intercourse  prior T levels low when previously measured libido and energy levels worse complaining of loss of energy and libido exertional sob no chest pain emphasized  need for cardiology evaluation discussed 'counseling" re life stresses  We had an extensive discussion re Risks of T replacement; Patient was informed about Black box warning from FDA. We discussed recent data regarding increased risk of coronary plaque size, heart disease; thrombolic event; increased Hbg/Hct, breast tenderness; acne; irritability; sleep apnea and increased urinary symptoms; effect on testicular function including suppression of spermatogenesis; hair loss (male pattern baldness) effect on LFTS; effect on prostate cancer. Different treatment approaches were discussed including IM, transdermal and Testopel We discussed advantages and disadvantages of each We discussed risk to T exposure to partners and children from transdermal appoaches.  Patient needs to see cardiologist re SALEEM and lack of energy complaining of nocturia (drinking tea before sleep) continues on flomax discussed cessation tea and Pandya  reviewed RCC and need for followup after partial nephrectomy  10.31.2022 RCC - no followup noted with Dr Aguirre FOLLOW-UP AFTER SURGERY  1. Patients who have been managed with surgery (PN or RN) for a malignant renal mass should be  classified into one of the following risk groups for surveillance:  Low Risk (LR): pT1 and Grade 1/2 Intermediate Risk (IR): pT1 and Grade 3/4 or pT2 any Grade High Risk (HR): pT3 any Grade  Very High Risk (VHR): pT4 or pN1, or sarcomatoid/rhabdoid  dedifferentiation, or macroscopic positive margin  If final microscopic surgical margins are positive for cancer, the risk category should be considered at  least one level higher, and increased clinical vigilance should be exercised. 2. Patients managed with surgery (PN or RN) for a renal malignancy should undergo abdominal imaging  according to Table 1, with CT or MRI pre- and post-intravenous contrast generally preferred. After 2  years, abdominal ultrasound alternating with cross-sectional imaging may be considered in the LR  and IR groups at physician discretion. After 5 years, informed/shared decision-making should dictate  further abdominal imaging.  3. Patients managed with surgery (PN or RN) for a renal malignancy should undergo chest imaging (CXR  for LR and IR, and CT chest generally preferred for HR and VHR) according to Table 1. After 5 years,  informed/shared decision-making discussion should dictate further chest imaging and CXR may be  utilized instead of chest CT for HR and VHR  AUA gudllines as above reviewed Will proceed with CT renal  Patient is concerned regarding his sexual function.  He and his wife have not been sexually active.  He has a daughter with anorexia who is His wife and he apart.  We discussed his cardiac risk factors.  In light of his severe cardiac disease I have asked him to obtain cardiac clearance prior to the utilization of phosphodiesterase inhibitors.  He will follow through with his cardiologist in this regard.  He also complains of nocturia.  He has symptoms of sleep apnea.  He has no residual urine.  He is on alpha blockers.  Prior to further intervention evaluation of his sleep apnea would be warranted.  This may also be affecting his sexual function.  We also discussed PSA screening for prostate cancer.  He wished to proceed with this.  Plan: 1.  PSA 2.  Testosterone estradiol 3.  Cardiac clearance regarding PDE 5 inhibitors 4.  Patient will obtain new PCP from cardiologist after the death of his PCP 5.  CT urogram re: renal clear cell carcinoma 6.  Follow-up in 3 months after sleep apnea assessment The BRET MAYERFLEXNO  expressed fully understanding of the information provided, the consequences and the management.   1.31.2023 did not undergo evaluation of sleep apnea planning to proceed with Dr Fisher IPSS 7 continues with nocturia mild Diuril symptoms continues on Flomax interested in potential intervention for LUTS although states his symptoms have improved with decrease caffeinated products and coffee discussed potential impact of sleep apnea and need to followup  discussed elective circumcision for cosmeses concerned re appearance of size of penis discussed weight reduction  dsicussed shave biopsy of penile pigmented lesion patient wishes to observe  RCC - CT reviewed with patient  Plan: 1. t/free t 2. ua and culture 3. fu cystoscopy with nitrous   CT s/p partial nephrectomy

## 2024-06-13 NOTE — HISTORY OF PRESENT ILLNESS
[FreeTextEntry1] : 51 year old male with history of stone (left side 9 mm) one year ago. Had elevated PSA which he did not followup. Had been on Flomax 2 tabs for urinary symptoms and stone disease. No followup for 1 year.   5.18.2017 no further flank pain did not pass any further stone no pain; no longer straining urine.  urinary symptoms improved on Flomax 2 tabs daily improved on 2 tablets; patient does not believe his symptoms warrant intervention.  11.16.2017 has not passed stone / passage of small stone urinary symptoms continue to be much improved no further episodes of pain or hematuria  5.7.2018 no further flank pain urinating with good stream nocturia x 1 stopped drinking MATE caffeinated product   11.9.2018 patient did not get renal usg re ? ureteral stone passage Patient thinks he passed ? small stone was supposed to get USG but did not because of excision of back cyst taking Tamsulosin when stopped procedure symtoms get worse and unhappy  5.10.2019 6 days ago developed  developed urinary symptoms dysuria and frequency called relative treated nitrufurantoin mild improvement also complaining of constipation after laxative symtoms  continues tamsulosin 2 tabs daily   5.31.2019 returns after treatment of prostatitis   9.4.2020 continues with LUTS on tamsulosin 2 tabs daily continues to drink coffee and cola he had been drinking 20 cups per day and now 2; aware of impact on symptoms complaining of decreased libido marital discord; life stresses   9.14.2020 referred to Dr Aguirre re renal mass  1.22.2021 s/p partial nephrectomy clear cell renal cell grade 2 returns complaining of loss of libido and energy discussed need for followup discussed repeat CT in one year  10.31.2022 patient s/p partial nephrectomy patient and wife living apart but "want to be together" concerned re sexual function  1.31.2022 seen in urgent care of ? allergic reaction on scrotum and legs on week no sexual activity triamcinolone methylprednisone dose pack responded to treatment ? change of soap continues to be  not with wife  6.12.24 Patient returns after 3-years. Patient has been having issues with elevated glucose; not on medication  Continues with nocturia; denies diurnal symptoms; Patients takes tamsulosin BID daily with good urination, but is experiences nocturia, but better urination during the day. Discussed evaluation for sleep apnea ; snores and obese Patient drinks Pandya coffee after evening meal  Patient states sildenafil variably effective Back with wife Multiple life stresses  Patient is experiencing retrograde ejaculation

## 2024-06-13 NOTE — ADDENDUM
[FreeTextEntry1] : 6/12/2024 this is an addendum to the note of 6/12/2024.  Note was lost in transcription through dragon.  Patient was complaining of loss of libido.  He has good erectile function.  He is concerned regarding his testosterone. He responds to sildenafil.  We discussed his multiple life stresses relating to the [place] as well as family issues and its impact on libido.  We discussed his urinary symptoms.  He mostly has nocturia.  He drinks AZEVEDO after his evening meal.  We discussed the significance of this.  We discussed behavior modification.  He has no diurnal symptoms.  We discussed PSA screening for prostate cancer.  He wished to proceed with this.  We discussed his previous history of renal cell carcinoma and the need for follow-up CT studies.  Plan: PSA Testosterone Prolactin CT renal mass protocol The BRET CHEEMA  expressed fully understanding of the information provided, the consequences and the management.

## 2024-06-13 NOTE — LETTER BODY
[Please see my note below.] : Please see my note below. [FreeTextEntry2] : LOULOU NORWOOD MD [FreeTextEntry1] :   Dear Doctor,     I had the opportunity to see your patient, Mr. BRET CHEEMA in followup. I am enclosing my office note for your information.   I will keep you informed of any developments.   Feel free to contact me if you have any questions.   Sincerely,   Chato Mason MD, FACS Professor of Urology [facility] [place] [address]   Office Telephone [phone]   Fax [phone]

## 2024-06-19 RX ORDER — AMLODIPINE BESYLATE 5 MG/1
5 TABLET ORAL DAILY
Qty: 90 | Refills: 1 | Status: ACTIVE | COMMUNITY
Start: 2022-09-22 | End: 1900-01-01

## 2024-06-19 RX ORDER — ROSUVASTATIN CALCIUM 20 MG/1
20 TABLET, FILM COATED ORAL DAILY
Qty: 90 | Refills: 1 | Status: ACTIVE | COMMUNITY
Start: 2019-08-22 | End: 1900-01-01

## 2024-06-19 RX ORDER — EZETIMIBE 10 MG/1
10 TABLET ORAL DAILY
Qty: 90 | Refills: 1 | Status: ACTIVE | COMMUNITY
Start: 2023-09-13 | End: 1900-01-01

## 2024-06-19 RX ORDER — METOPROLOL SUCCINATE 50 MG/1
50 TABLET, EXTENDED RELEASE ORAL
Qty: 90 | Refills: 1 | Status: ACTIVE | COMMUNITY
Start: 2017-12-18 | End: 1900-01-01

## 2024-06-24 ENCOUNTER — APPOINTMENT (OUTPATIENT)
Dept: CT IMAGING | Facility: CLINIC | Age: 59
End: 2024-06-24

## 2024-07-02 ENCOUNTER — OUTPATIENT (OUTPATIENT)
Dept: OUTPATIENT SERVICES | Facility: HOSPITAL | Age: 59
LOS: 1 days | End: 2024-07-02

## 2024-07-02 ENCOUNTER — NON-APPOINTMENT (OUTPATIENT)
Age: 59
End: 2024-07-02

## 2024-07-02 ENCOUNTER — APPOINTMENT (OUTPATIENT)
Dept: CT IMAGING | Facility: CLINIC | Age: 59
End: 2024-07-02
Payer: COMMERCIAL

## 2024-07-02 DIAGNOSIS — Z41.9 ENCOUNTER FOR PROCEDURE FOR PURPOSES OTHER THAN REMEDYING HEALTH STATE, UNSPECIFIED: Chronic | ICD-10-CM

## 2024-07-02 DIAGNOSIS — Z98.890 OTHER SPECIFIED POSTPROCEDURAL STATES: Chronic | ICD-10-CM

## 2024-07-02 PROCEDURE — 74178 CT ABD&PLV WO CNTR FLWD CNTR: CPT | Mod: 26

## 2024-07-03 ENCOUNTER — NON-APPOINTMENT (OUTPATIENT)
Age: 59
End: 2024-07-03

## 2024-09-25 ENCOUNTER — LABORATORY RESULT (OUTPATIENT)
Age: 59
End: 2024-09-25

## 2024-09-25 ENCOUNTER — APPOINTMENT (OUTPATIENT)
Dept: CARDIOLOGY | Facility: CLINIC | Age: 59
End: 2024-09-25
Payer: COMMERCIAL

## 2024-09-25 VITALS
SYSTOLIC BLOOD PRESSURE: 126 MMHG | RESPIRATION RATE: 16 BRPM | HEART RATE: 64 BPM | TEMPERATURE: 97.6 F | OXYGEN SATURATION: 99 % | BODY MASS INDEX: 34.07 KG/M2 | WEIGHT: 238 LBS | DIASTOLIC BLOOD PRESSURE: 79 MMHG | HEIGHT: 70 IN

## 2024-09-25 DIAGNOSIS — E78.5 HYPERLIPIDEMIA, UNSPECIFIED: ICD-10-CM

## 2024-09-25 DIAGNOSIS — I25.2 OLD MYOCARDIAL INFARCTION: ICD-10-CM

## 2024-09-25 DIAGNOSIS — I10 ESSENTIAL (PRIMARY) HYPERTENSION: ICD-10-CM

## 2024-09-25 DIAGNOSIS — R01.1 CARDIAC MURMUR, UNSPECIFIED: ICD-10-CM

## 2024-09-25 DIAGNOSIS — I25.10 ATHEROSCLEROTIC HEART DISEASE OF NATIVE CORONARY ARTERY W/OUT ANGINA PECTORIS: ICD-10-CM

## 2024-09-25 PROCEDURE — 99213 OFFICE O/P EST LOW 20 MIN: CPT | Mod: 25

## 2024-09-25 PROCEDURE — 93015 CV STRESS TEST SUPVJ I&R: CPT

## 2024-09-25 PROCEDURE — 93306 TTE W/DOPPLER COMPLETE: CPT

## 2024-09-25 NOTE — DISCUSSION/SUMMARY
[FreeTextEntry1] : This is a 59-year-old male with past medical history significant for coronary artery disease status post myocardial infarction, status post coronary artery bypass surgery in February 2016 at [facility], hyperlipidemia, hypertension, comes in for follow-up cardiac evaluation. The patient requires preoperative clearance for gastrointestinal evaluation including endoscopy. He denies chest pain, shortness of breath, dizziness or syncope. The patient had normal exercise stress test September 25, 2024. The patient will have new blood work done today for lipid panel, direct LDL cholesterol, hemoglobin A1c and TSH.  He will have new blood work done today for lipid panel, electrolytes, hemoglobin A1c, TSH, and CBC. Lipid profile done February 26, 2024 demonstrated cholesterol 107, HDL 47, triglycerides under 26, LDL calculated 38, non-HDL cholesterol 60, hemoglobin A1c of 6.1. Echo Doppler examination done September 13, 2023 demonstrated normal ejection fraction of 55 to 60% with estimated ejection fraction of 60%, left atrial enlargement, mild to moderate aortic valve regurgitation, mild tricuspid valve regurgitation, mild mitral valve regurgitation, possible bicuspid aortic valve with mild aortic valve stenosis.  Thinning and dyskinesis of the interatrial septum, and segmental hypokinesis of the interventricular septum, and minimal pulmonic valve regurgitation. He will schedule repeat echo Doppler examination September 25, 2024 to evaluate his aortic valve, left ventricular function, chamber size, and rule out hypertrophy. Electrocardiogram done April 3, 2024 demonstrated normal sinus rhythm rate 76 bpm is otherwise remarkable for nonspecific ST wave changes. The patient is concerned about weight gain.  He is interested in changing his diet and motivated.  I recommended he work with a registered dietitian to achieve weight loss goals. He is also a good candidate for GLP-1 agonist therapy.  He has no personal family history of medullary thyroid cancer or M EN syndrome.  However he does not wish to self inject and he prefers to not truly lose weight. I will also asked him to increase aerobic activity to least 40 minutes 4 times per week. Lipid panel done February 26, 2024 demonstrated a cholesterol 107, HDL 47, triglycerides 126, LDL 38, non-HDL cholesterol 60 mg/dL and hemoglobin A1c of 6.1.  The patient will continue his Zetia 10 mg daily and continue his current dose of Crestor 20 mg daily.  The patient had been on amlodipine 5 mg after dinner. He is currently taking amlodipine as needed based on his own blood pressure readings.  I have explained to him that he should be taking it nightly.  He will take this under consideration He does not wish to start on diuretic therapy as he has frequent urination. I have also asked him to schedule sleep study rule out sleep apnea.  Electrocardiogram done January 27, 2022 demonstrated normal sinus rhythm rate 60 beats per minute is otherwise remarkable for left axis deviation and left atrial abnormality. Blood work done January 28, 2022 demonstrated lipoprotein B of 49, direct LDL of 47, hemoglobin A1c of 6.0,  Echo Doppler examination done November 6, 2020 demonstrated minimal to mild mitral valve regurgitation, mild tricuspid valve regurgitation, mild pulmonic valve regurgitation, aortic valve thickening and sclerosis with mild to moderate aortic valve regurgitation and satisfactory left ventricular function with estimated ejection fraction of 50-55%. He is currently hemodynamically stable and asymptomatic from cardiac standpoint. The patient had laparoscopic removal of a right kidney lesion and ventral hernia repair November 12, 2020. He had a normal exercise stress test November 6, 2020. Electrocardiogram done March 12, 2020 demonstrated normal sinus rhythm at a rate of 78 bpm is otherwise remarkable for left axis deviation, and left atrial abnormality.  The patient understands that aerobic exercises must be increased to 40 minutes 4 times per week. A detailed discussion of lifestyle modification was done today. The patient has a good understanding of the diagnosis, and treatment plan. Lifestyle modification was also outlined.  The patient is cleared from a cardiac standpoint for gastrointestinal evaluation including endoscopy.  Please avoid overhydration.

## 2024-10-15 ENCOUNTER — RX RENEWAL (OUTPATIENT)
Age: 59
End: 2024-10-15

## 2024-11-12 ENCOUNTER — RX RENEWAL (OUTPATIENT)
Age: 59
End: 2024-11-12

## 2024-11-25 ENCOUNTER — NON-APPOINTMENT (OUTPATIENT)
Age: 59
End: 2024-11-25

## 2024-12-11 ENCOUNTER — APPOINTMENT (OUTPATIENT)
Dept: UROLOGY | Facility: CLINIC | Age: 59
End: 2024-12-11
Payer: COMMERCIAL

## 2024-12-11 DIAGNOSIS — R37 SEXUAL DYSFUNCTION, UNSPECIFIED: ICD-10-CM

## 2024-12-11 DIAGNOSIS — R35.0 FREQUENCY OF MICTURITION: ICD-10-CM

## 2024-12-11 DIAGNOSIS — N48.9 DISORDER OF PENIS, UNSPECIFIED: ICD-10-CM

## 2024-12-11 DIAGNOSIS — C64.1 MALIGNANT NEOPLASM OF RIGHT KIDNEY, EXCEPT RENAL PELVIS: ICD-10-CM

## 2024-12-11 PROCEDURE — 99214 OFFICE O/P EST MOD 30 MIN: CPT | Mod: 25

## 2024-12-11 PROCEDURE — 51741 ELECTRO-UROFLOWMETRY FIRST: CPT

## 2024-12-11 PROCEDURE — 51798 US URINE CAPACITY MEASURE: CPT

## 2024-12-12 ENCOUNTER — TRANSCRIPTION ENCOUNTER (OUTPATIENT)
Age: 59
End: 2024-12-12

## 2024-12-12 LAB
APPEARANCE: CLEAR
BACTERIA: NEGATIVE /HPF
BILIRUBIN URINE: NEGATIVE
BLOOD URINE: NEGATIVE
CAST: 1 /LPF
COLOR: YELLOW
EPITHELIAL CELLS: 1 /HPF
GLUCOSE QUALITATIVE U: NEGATIVE MG/DL
KETONES URINE: NEGATIVE MG/DL
LEUKOCYTE ESTERASE URINE: NEGATIVE
MICROSCOPIC-UA: NORMAL
NITRITE URINE: NEGATIVE
PH URINE: 6
PROTEIN URINE: NEGATIVE MG/DL
PSA FREE FLD-MCNC: 22 %
PSA FREE SERPL-MCNC: 1.2 NG/ML
PSA SERPL-MCNC: 5.41 NG/ML
RED BLOOD CELLS URINE: 2 /HPF
SPECIFIC GRAVITY URINE: 1.02
UROBILINOGEN URINE: 0.2 MG/DL
WHITE BLOOD CELLS URINE: 0 /HPF

## 2024-12-13 ENCOUNTER — RX RENEWAL (OUTPATIENT)
Age: 59
End: 2024-12-13

## 2024-12-14 LAB — BACTERIA UR CULT: NORMAL

## 2024-12-16 ENCOUNTER — NON-APPOINTMENT (OUTPATIENT)
Age: 59
End: 2024-12-16

## 2024-12-16 ENCOUNTER — RX RENEWAL (OUTPATIENT)
Age: 59
End: 2024-12-16

## 2025-01-03 ENCOUNTER — APPOINTMENT (OUTPATIENT)
Dept: MRI IMAGING | Facility: CLINIC | Age: 60
End: 2025-01-03
Payer: COMMERCIAL

## 2025-01-03 ENCOUNTER — RESULT REVIEW (OUTPATIENT)
Age: 60
End: 2025-01-03

## 2025-01-03 ENCOUNTER — OUTPATIENT (OUTPATIENT)
Dept: OUTPATIENT SERVICES | Facility: HOSPITAL | Age: 60
LOS: 1 days | End: 2025-01-03

## 2025-01-03 ENCOUNTER — APPOINTMENT (OUTPATIENT)
Dept: RADIOLOGY | Facility: CLINIC | Age: 60
End: 2025-01-03
Payer: COMMERCIAL

## 2025-01-03 DIAGNOSIS — Z98.890 OTHER SPECIFIED POSTPROCEDURAL STATES: Chronic | ICD-10-CM

## 2025-01-03 DIAGNOSIS — Z41.9 ENCOUNTER FOR PROCEDURE FOR PURPOSES OTHER THAN REMEDYING HEALTH STATE, UNSPECIFIED: Chronic | ICD-10-CM

## 2025-01-03 PROCEDURE — 76498P: CUSTOM | Mod: 26

## 2025-01-03 PROCEDURE — 71046 X-RAY EXAM CHEST 2 VIEWS: CPT | Mod: 26

## 2025-01-03 PROCEDURE — 72197 MRI PELVIS W/O & W/DYE: CPT | Mod: 26

## 2025-01-06 ENCOUNTER — TRANSCRIPTION ENCOUNTER (OUTPATIENT)
Age: 60
End: 2025-01-06

## 2025-01-07 ENCOUNTER — NON-APPOINTMENT (OUTPATIENT)
Age: 60
End: 2025-01-07

## 2025-01-08 ENCOUNTER — APPOINTMENT (OUTPATIENT)
Dept: UROLOGY | Facility: CLINIC | Age: 60
End: 2025-01-08
Payer: COMMERCIAL

## 2025-01-08 VITALS
SYSTOLIC BLOOD PRESSURE: 126 MMHG | DIASTOLIC BLOOD PRESSURE: 74 MMHG | HEART RATE: 72 BPM | TEMPERATURE: 97.3 F | OXYGEN SATURATION: 96 %

## 2025-01-08 DIAGNOSIS — R97.20 ELEVATED PROSTATE, SPECIFIC ANTIGEN [PSA]: ICD-10-CM

## 2025-01-08 PROCEDURE — 99213 OFFICE O/P EST LOW 20 MIN: CPT

## 2025-01-08 PROCEDURE — G2211 COMPLEX E/M VISIT ADD ON: CPT | Mod: NC

## 2025-01-09 LAB
PSA FREE FLD-MCNC: 26 %
PSA FREE SERPL-MCNC: 1.32 NG/ML
PSA SERPL-MCNC: 5.13 NG/ML

## 2025-01-17 ENCOUNTER — NON-APPOINTMENT (OUTPATIENT)
Age: 60
End: 2025-01-17

## 2025-01-17 ENCOUNTER — TRANSCRIPTION ENCOUNTER (OUTPATIENT)
Age: 60
End: 2025-01-17

## 2025-02-21 ENCOUNTER — TRANSCRIPTION ENCOUNTER (OUTPATIENT)
Age: 60
End: 2025-02-21

## 2025-03-11 ENCOUNTER — RX RENEWAL (OUTPATIENT)
Age: 60
End: 2025-03-11

## 2025-03-12 ENCOUNTER — APPOINTMENT (OUTPATIENT)
Dept: UROLOGY | Facility: CLINIC | Age: 60
End: 2025-03-12
Payer: COMMERCIAL

## 2025-03-12 ENCOUNTER — NON-APPOINTMENT (OUTPATIENT)
Age: 60
End: 2025-03-12

## 2025-03-12 VITALS
TEMPERATURE: 97.2 F | OXYGEN SATURATION: 95 % | HEART RATE: 75 BPM | SYSTOLIC BLOOD PRESSURE: 112 MMHG | DIASTOLIC BLOOD PRESSURE: 63 MMHG

## 2025-03-12 DIAGNOSIS — N20.0 CALCULUS OF KIDNEY: ICD-10-CM

## 2025-03-12 DIAGNOSIS — R35.0 FREQUENCY OF MICTURITION: ICD-10-CM

## 2025-03-12 DIAGNOSIS — Z85.528 PERSONAL HISTORY OF OTHER MALIGNANT NEOPLASM OF KIDNEY: ICD-10-CM

## 2025-03-12 DIAGNOSIS — C64.1 MALIGNANT NEOPLASM OF RIGHT KIDNEY, EXCEPT RENAL PELVIS: ICD-10-CM

## 2025-03-12 PROCEDURE — 99214 OFFICE O/P EST MOD 30 MIN: CPT

## 2025-03-13 ENCOUNTER — TRANSCRIPTION ENCOUNTER (OUTPATIENT)
Age: 60
End: 2025-03-13

## 2025-03-13 LAB
PSA FREE FLD-MCNC: 22 %
PSA FREE SERPL-MCNC: 1.27 NG/ML
PSA SERPL-MCNC: 5.69 NG/ML

## 2025-03-14 ENCOUNTER — TRANSCRIPTION ENCOUNTER (OUTPATIENT)
Age: 60
End: 2025-03-14

## 2025-04-21 ENCOUNTER — RX RENEWAL (OUTPATIENT)
Age: 60
End: 2025-04-21

## 2025-05-02 NOTE — PATIENT PROFILE ADULT - PHONE #
Declined, patient prefers to self schedule follow up appt, CT 5/2 (neurosurgery referral).
[phone]

## 2025-07-15 ENCOUNTER — RX RENEWAL (OUTPATIENT)
Age: 60
End: 2025-07-15

## 2025-07-31 ENCOUNTER — APPOINTMENT (OUTPATIENT)
Dept: UROLOGY | Facility: CLINIC | Age: 60
End: 2025-07-31
Payer: COMMERCIAL

## 2025-07-31 DIAGNOSIS — R97.20 ELEVATED PROSTATE, SPECIFIC ANTIGEN [PSA]: ICD-10-CM

## 2025-07-31 DIAGNOSIS — R68.82 DECREASED LIBIDO: ICD-10-CM

## 2025-07-31 DIAGNOSIS — R79.89 OTHER SPECIFIED ABNORMAL FINDINGS OF BLOOD CHEMISTRY: ICD-10-CM

## 2025-07-31 DIAGNOSIS — Z12.5 ENCOUNTER FOR SCREENING FOR MALIGNANT NEOPLASM OF PROSTATE: ICD-10-CM

## 2025-07-31 DIAGNOSIS — Z85.528 PERSONAL HISTORY OF OTHER MALIGNANT NEOPLASM OF KIDNEY: ICD-10-CM

## 2025-07-31 PROCEDURE — 51798 US URINE CAPACITY MEASURE: CPT

## 2025-07-31 PROCEDURE — 99214 OFFICE O/P EST MOD 30 MIN: CPT | Mod: 25

## 2025-08-18 ENCOUNTER — RX RENEWAL (OUTPATIENT)
Age: 60
End: 2025-08-18